# Patient Record
Sex: MALE | Race: BLACK OR AFRICAN AMERICAN | Employment: FULL TIME | URBAN - METROPOLITAN AREA
[De-identification: names, ages, dates, MRNs, and addresses within clinical notes are randomized per-mention and may not be internally consistent; named-entity substitution may affect disease eponyms.]

---

## 2017-07-07 PROBLEM — E83.111 HEMOCHROMATOSIS DUE TO REPEATED RED BLOOD CELL TRANSFUSIONS: Status: ACTIVE | Noted: 2017-07-07

## 2017-07-07 PROBLEM — M25.562 PAIN IN BOTH KNEES: Status: ACTIVE | Noted: 2017-07-07

## 2017-07-07 PROBLEM — G43.909 MIGRAINE: Status: ACTIVE | Noted: 2017-07-07

## 2017-07-07 PROBLEM — M54.9 BACK PAIN: Status: ACTIVE | Noted: 2017-07-07

## 2017-07-07 PROBLEM — M25.561 PAIN IN BOTH KNEES: Status: ACTIVE | Noted: 2017-07-07

## 2020-06-03 ENCOUNTER — HOSPITAL ENCOUNTER (EMERGENCY)
Age: 55
Discharge: HOME | End: 2020-06-03
Attending: EMERGENCY MEDICINE
Payer: COMMERCIAL

## 2020-06-03 VITALS
OXYGEN SATURATION: 97 % | SYSTOLIC BLOOD PRESSURE: 129 MMHG | BODY MASS INDEX: 34.55 KG/M2 | RESPIRATION RATE: 16 BRPM | WEIGHT: 215 LBS | HEART RATE: 94 BPM | HEIGHT: 66 IN | DIASTOLIC BLOOD PRESSURE: 87 MMHG | TEMPERATURE: 98.3 F

## 2020-06-03 DIAGNOSIS — T78.40XA ALLERGIC REACTION, INITIAL ENCOUNTER: Primary | ICD-10-CM

## 2020-06-03 PROCEDURE — 74011250636 HC RX REV CODE- 250/636: Performed by: EMERGENCY MEDICINE

## 2020-06-03 PROCEDURE — 99283 EMERGENCY DEPT VISIT LOW MDM: CPT

## 2020-06-03 PROCEDURE — 96372 THER/PROPH/DIAG INJ SC/IM: CPT

## 2020-06-03 RX ORDER — DEXAMETHASONE SODIUM PHOSPHATE 100 MG/10ML
10 INJECTION INTRAMUSCULAR; INTRAVENOUS
Status: COMPLETED | OUTPATIENT
Start: 2020-06-03 | End: 2020-06-03

## 2020-06-03 RX ORDER — EPINEPHRINE 0.3 MG/.3ML
0.3 INJECTION SUBCUTANEOUS
Qty: 0.3 ML | Refills: 0 | Status: SHIPPED | OUTPATIENT
Start: 2020-06-03 | End: 2020-06-03

## 2020-06-03 RX ADMIN — DEXAMETHASONE SODIUM PHOSPHATE 10 MG: 10 INJECTION INTRAMUSCULAR; INTRAVENOUS at 22:17

## 2020-06-03 NOTE — LETTER
[address] [facility] EMERGENCY DEPT 
[address] [facility] 
[phone] Work/School Note Date: 6/3/2020 To Whom It May concern: 
 
Clarice Ortiz was seen and treated today in the emergency room by the following provider(s): 
Attending Provider: Charito Wang MD. Clarice Ortiz may return to work on 6/5/20.  
 
Sincerely, 
 
 
 
 
Oni Noyola RN

## 2020-06-04 NOTE — ED PROVIDER NOTES
Patient was stung by an swatted a mosquito on his hand yesterday evening. This morning began with some itching and swelling throughout the day. No warmth redness or pain unless he tries to close his fist secondary to the swelling. No fever. Patient denies any wheezing trouble breathing or swelling of his lips face tongue or throat. No prior allergies to mosquitoes. He does have a pork allergy. The history is provided by the patient. Hand Swelling    This is a new problem. The current episode started 12 to 24 hours ago. The problem occurs constantly. The problem has been gradually worsening. Pain location: Left hand in the web between thumb and index finger. The pain is mild. Associated symptoms include itching. Pertinent negatives include no numbness and no tingling. He has tried nothing for the symptoms. There has been no history of extremity trauma. 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       Family History:   Problem Relation Age of Onset    Diabetes Mother     Hypertension Mother     Alzheimer Mother     Hypertension Father     Stroke Father     Diabetes Brother     Diabetes Sister     Hemachromatosis Other     Migraines Other        Social History     Socioeconomic History    Marital status:      Spouse name: Not on file    Number of children: Not on file    Years of education: Not on file    Highest education level: Not on file   Occupational History    Not on file   Social Needs    Financial resource strain: Not on file    Food insecurity     Worry: Not on file     Inability: Not on file   [employer] needs     Medical: Not on file     Non-medical: Not on file   Tobacco Use    Smoking status: Current Every Day Smoker     Packs/day: 1.00    Smokeless tobacco: Never Used   Substance and Sexual Activity    Alcohol use: No    Drug use: Yes     Types: Marijuana     Comment: 04/06/2011    Sexual activity: Not on file   Lifestyle    Physical activity     Days per week: Not on file     Minutes per session: Not on file    Stress: Not on file   Relationships    Social connections     Talks on phone: Not on file     Gets together: Not on file     Attends Quaker service: Not on file     Active member of club or organization: Not on file     Attends meetings of clubs or organizations: Not on file     Relationship status: Not on file    Intimate partner violence     Fear of current or ex partner: Not on file     Emotionally abused: Not on file     Physically abused: Not on file     Forced sexual activity: Not on file   Other Topics Concern    Not on file   Social History Narrative    Not on file         ALLERGIES: Pork/porcine containing products    Review of Systems   Constitutional: Negative for fever. Respiratory: Negative for shortness of breath and wheezing. Gastrointestinal: Negative for diarrhea, nausea and vomiting. Skin: Positive for itching. Negative for color change and rash. Neurological: Negative for tingling, weakness and numbness. Vitals:    06/03/20 2147   BP: (!) 144/94   Pulse: 94   Resp: 18   Temp: 98.2 °F (36.8 °C)   SpO2: 96%   Weight: 97.5 kg (215 lb)   Height: 5' 6\" (1.676 m)            Physical Exam  Vitals signs and nursing note reviewed. Cardiovascular:      Rate and Rhythm: Normal rate and regular rhythm. Heart sounds: Normal heart sounds. Pulmonary:      Effort: Pulmonary effort is normal.      Breath sounds: Normal breath sounds. Musculoskeletal:        Hands:    Neurological:      Mental Status: He is alert. MDM  Number of Diagnoses or Management Options  Diagnosis management comments: Probable localized reaction to mosquito bite or what ever stung him. No prior history of this. Decadron ordered. Antihistamines Pepcid will be recommended. No oral steroids as the swelling is pretty mild. EpiPen will be prescribed as a precaution. There is no sign of cellulitis.          Procedures

## 2020-06-04 NOTE — DISCHARGE INSTRUCTIONS
Patient Education      Tylenol for pain if needed. Elevate hand above heart to help with swelling. Ice for 15 minutes every 6 hours while awake for 2 to 3 days. Benadryl 25 to 50 mg every 6-8 hours as needed for itching. Pepcid 20 mg twice daily for 1 week. Use epinephrine injector in your lateral thigh if you develop skin rash with swelling of the lips face tongue or throat and return immediately to the emergency department. Follow-up with your primary care doctor in 7 to 10 days if symptoms do not improve. Allergic Reaction: Care Instructions  Your Care Instructions     An allergic reaction is an excessive response from your immune system to a medicine, chemical, food, insect bite, or other substance. A reaction can range from mild to life-threatening. Some people have a mild rash, hives, and itching or stomach cramps. In severe reactions, swelling of your tongue and throat can close up your airway so that you cannot breathe. Follow-up care is a key part of your treatment and safety. Be sure to make and go to all appointments, and call your doctor if you are having problems. It's also a good idea to know your test results and keep a list of the medicines you take. How can you care for yourself at home? · If you know what caused your allergic reaction, be sure to avoid it. Your allergy may become more severe each time you have a reaction. · Take an over-the-counter antihistamine, such as cetirizine (Zyrtec) or loratadine (Claritin), to treat mild symptoms. Read and follow directions on the label. Some antihistamines can make you feel sleepy. Do not give antihistamines to a child unless you have checked with your doctor first. Mild symptoms include sneezing or an itchy or runny nose; an itchy mouth; a few hives or mild itching; and mild nausea or stomach discomfort. · Do not scratch hives or a rash. Put a cold, moist towel on them or take cool baths to relieve itching.  Put ice packs on hives, swelling, or insect stings for 10 to 15 minutes at a time. Put a thin cloth between the ice pack and your skin. Do not take hot baths or showers. They will make the itching worse. · Your doctor may prescribe a shot of epinephrine to carry with you in case you have a severe reaction. Learn how to give yourself the shot and keep it with you at all times. Make sure it is not . · Go to the emergency room every time you have a severe reaction, even if you have used your shot of epinephrine and are feeling better. Symptoms can come back after a shot. · Wear medical alert jewelry that lists your allergies. You can buy this at most [employer]. · If your child has a severe allergy, make sure that his or her teachers, babysitters, coaches, and other caregivers know about the allergy. They should have an epinephrine shot, know how and when to give it, and have a plan to take your child to the hospital.  When should you call for help? Give an epinephrine shot if:  · You think you are having a severe allergic reaction. · You have symptoms in more than one body area, such as mild nausea and an itchy mouth. After giving an epinephrine shot call 911, even if you feel better. [id number] if:  · You have symptoms of a severe allergic reaction. These may include:  ? Sudden raised, red areas (hives) all over your body. ? Swelling of the throat, mouth, lips, or tongue. ? Trouble breathing. ? Passing out (losing consciousness). Or you may feel very lightheaded or suddenly feel weak, confused, or restless. · You have been given an epinephrine shot, even if you feel better. Call your doctor now or seek immediate medical care if:  · You have symptoms of an allergic reaction, such as:  ? A rash or hives (raised, red areas on the skin). ? Itching. ? Swelling. ? Belly pain, nausea, or vomiting. Watch closely for changes in your health, and be sure to contact your doctor if:  · You do not get better as expected.   Where can you learn more?  Go to http://jessie-whit.info/  Enter C815 in the search box to learn more about \"Allergic Reaction: Care Instructions. \"  Current as of: October 7, 2019               Content Version: 12.5  © 7772-6196 Healthwise, Incorporated. Care instructions adapted under license by [employer] (which disclaims liability or warranty for this information). If you have questions about a medical condition or this instruction, always ask your healthcare professional. [address] any warranty or liability for your use of this information.

## 2020-06-04 NOTE — ED NOTES
I have reviewed discharge instructions with the patient. The patient verbalized understanding. Patient left ED via Discharge Method: ambulatory to Home with self. Opportunity for questions and clarification provided. Patient given 1 scripts. To continue your aftercare when you leave the hospital, you may receive an automated call from our care team to check in on how you are doing. This is a free service and part of our promise to provide the best care and service to meet your aftercare needs.  If you have questions, or wish to unsubscribe from this service please call [phone]. Thank you for Choosing our [facility] Emergency Department.

## 2020-08-26 ENCOUNTER — APPOINTMENT (OUTPATIENT)
Dept: GENERAL RADIOLOGY | Age: 55
End: 2020-08-26
Attending: EMERGENCY MEDICINE
Payer: COMMERCIAL

## 2020-08-26 ENCOUNTER — HOSPITAL ENCOUNTER (EMERGENCY)
Age: 55
Discharge: HOME | End: 2020-08-26
Attending: EMERGENCY MEDICINE
Payer: COMMERCIAL

## 2020-08-26 VITALS
DIASTOLIC BLOOD PRESSURE: 90 MMHG | HEIGHT: 66 IN | WEIGHT: 220 LBS | HEART RATE: 88 BPM | BODY MASS INDEX: 35.36 KG/M2 | RESPIRATION RATE: 18 BRPM | SYSTOLIC BLOOD PRESSURE: 138 MMHG | TEMPERATURE: 98.6 F | OXYGEN SATURATION: 97 %

## 2020-08-26 DIAGNOSIS — M17.11 OSTEOARTHRITIS OF RIGHT KNEE, UNSPECIFIED OSTEOARTHRITIS TYPE: Primary | ICD-10-CM

## 2020-08-26 PROCEDURE — 73562 X-RAY EXAM OF KNEE 3: CPT

## 2020-08-26 PROCEDURE — 99283 EMERGENCY DEPT VISIT LOW MDM: CPT

## 2020-08-26 RX ORDER — TRAMADOL HYDROCHLORIDE 50 MG/1
50 TABLET ORAL
Qty: 11 TAB | Refills: 0 | Status: SHIPPED | OUTPATIENT
Start: 2020-08-26 | End: 2020-08-29

## 2020-08-26 RX ORDER — PREDNISONE 20 MG/1
40 TABLET ORAL DAILY
Qty: 10 TAB | Refills: 0 | Status: SHIPPED | OUTPATIENT
Start: 2020-08-26 | End: 2020-08-31

## 2020-08-26 NOTE — ED NOTES
I have reviewed discharge instructions with the patient. The patient verbalized understanding. Patient left ED via Discharge Method: ambulatory to Home. Opportunity for questions and clarification provided. Patient given 2 scripts. To continue your aftercare when you leave the hospital, you may receive an automated call from our care team to check in on how you are doing. This is a free service and part of our promise to provide the best care and service to meet your aftercare needs.  If you have questions, or wish to unsubscribe from this service please call [phone]. Thank you for Choosing our [facility] Emergency Department.

## 2020-08-26 NOTE — LETTER
NOTIFICATION RETURN TO WORK / SCHOOL 
 
8/26/2020 1:51 AM 
 
Mr. Jami Estrada [address] [facility] [id number] To Whom It May Concern: 
 
Jami Estrada is currently under the care of [facility] EMERGENCY DEPT. He will return to work/school on:  8/28/20 If there are questions or concerns please have the patient contact our office. Sincerely, Narinder Frazier MD

## 2020-08-26 NOTE — ED TRIAGE NOTES
Arrives with face mask in place. Arrives ambulatory with difficulty to triage. Reports right knee pain, onset approx 1 week pta. Denies injury/trauma. States \"popped\" when getting out of shower yesterday AM. Increased pain since. Reports works as , climbs up onto machine. Reports excessive bending and squatting. Hx chronic knee problems.  Arrives with knee brace on

## 2020-08-26 NOTE — DISCHARGE INSTRUCTIONS
Take medications as prescribed  If symptoms persist, follow-up with orthopedics  Return to the ER for any new, worsening or life-threatening symptoms      Knee Arthritis: Care Instructions  Your Care Instructions     Knee arthritis is a breakdown of the cartilage that cushions your knee joint. When the cartilage wears down, your bones rub against each other. This causes pain and stiffness. Knee arthritis tends to get worse with time. Treatment for knee arthritis involves reducing pain, making the leg muscles stronger, and staying at a healthy body weight. The treatment usually does not improve the health of the cartilage, but it can reduce pain and improve how well your knee works. You can take simple measures to protect your knee joints, ease your pain, and help you stay active. Follow-up care is a key part of your treatment and safety. Be sure to make and go to all appointments, and call your doctor if you are having problems. It's also a good idea to know your test results and keep a list of the medicines you take. How can you care for yourself at home? · Know that knee arthritis will cause more pain on some days than on others. · Stay at a healthy weight. Lose weight if you are overweight. When you stand up, the pressure on your knees from every pound of body weight is multiplied four times. So if you lose 10 pounds, you will reduce the pressure on your knees by 40 pounds. · Talk to your doctor or physical therapist about exercises that will help ease joint pain. ? Stretch to help prevent stiffness and to prevent injury before you exercise. You may enjoy gentle forms of yoga to help keep your knee joints and muscles flexible. ? Walk instead of jog.  ? Ride a bike. This makes your thigh muscles stronger and takes pressure off your knee. ? Wear well-fitting and comfortable shoes. ? Exercise in chest-deep water. This can help you exercise longer with less pain. ?  Avoid exercises that include squatting or kneeling. They can put a lot of strain on your knees. ? Talk to your doctor to make sure that the exercise you do is not making the arthritis worse. · Do not sit for long periods of time. Try to walk once in a while to keep your knee from getting stiff. · Ask your doctor or physical therapist whether shoe inserts may reduce your arthritis pain. · If you can afford it, get new athletic shoes at least every year. This can help reduce the strain on your knees. · Use a device to help you do everyday activities. ? A cane or walking stick can help you keep your balance when you walk. Hold the cane or walking stick in the hand opposite the painful knee. ? If you feel like you may fall when you walk, try using crutches or a front-wheeled walker. These can prevent falls that could cause more damage to your knee. ? A knee brace may help keep your knee stable and prevent pain. ? You also can use other things to make life easier, such as a higher toilet seat and handrails in the bathtub or shower. · Take pain medicines exactly as directed. ? Do not wait until you are in severe pain. You will get better results if you take it sooner. ? If you are not taking a prescription pain medicine, take an over-the-counter medicine such as acetaminophen (Tylenol), ibuprofen (Advil, Motrin), or naproxen (Aleve). Read and follow all instructions on the label. ? Do not take two or more pain medicines at the same time unless the doctor told you to. Many pain medicines have acetaminophen, which is Tylenol. Too much acetaminophen (Tylenol) can be harmful. ? Tell your doctor if you take a blood thinner, have diabetes, or have allergies to shellfish. · Ask your doctor if you might benefit from a shot of steroid medicine into your knee. This may provide pain relief for several months. · Many people take the supplements glucosamine and chondroitin for osteoarthritis.  Some people feel they help, but the medical research does not show that they work. Talk to your doctor before you take these supplements. When should you call for help? Call your doctor now or seek immediate medical care if:  · You have sudden swelling, warmth, or pain in your knee. · You have knee pain and a fever or rash. · You have such bad pain that you cannot use your knee. Watch closely for changes in your health, and be sure to contact your doctor if you have any problems. Where can you learn more? Go to http://www.gray.com/  Enter W187 in the search box to learn more about \"Knee Arthritis: Care Instructions. \"  Current as of: December 9, 2019               Content Version: 12.5  © 8223-0829 Healthways. Care instructions adapted under license by [employer] (which disclaims liability or warranty for this information). If you have questions about a medical condition or this instruction, always ask your healthcare professional. [place] [address] any warranty or liability for your use of this information. Patient Education        Knee Arthritis: Exercises  Introduction  Here are some examples of exercises for you to try. The exercises may be suggested for a condition or for rehabilitation. Start each exercise slowly. Ease off the exercises if you start to have pain. You will be told when to start these exercises and which ones will work best for you. How to do the exercises  Knee flexion with heel slide   1. Lie on your back with your knees bent. 2. Slide your heel back by bending your affected knee as far as you can. Then hook your other foot around your ankle to help pull your heel even farther back. 3. Hold for about 6 seconds, then rest for up to 10 seconds. 4. Repeat 8 to 12 times. 5. Switch legs and repeat steps 1 through 4, even if only one knee is sore. Quad sets   1. Sit with your affected leg straight and supported on the floor or a firm bed.  Place a small, rolled-up towel under your knee. Your other leg should be bent, with that foot flat on the floor. 2. Tighten the thigh muscles of your affected leg by pressing the back of your knee down into the towel. 3. Hold for about 6 seconds, then rest for up to 10 seconds. 4. Repeat 8 to 12 times. 5. Switch legs and repeat steps 1 through 4, even if only one knee is sore. Straight-leg raises to the front   1. Lie on your back with your good knee bent so that your foot rests flat on the floor. Your affected leg should be straight. Make sure that your low back has a normal curve. You should be able to slip your hand in between the floor and the small of your back, with your palm touching the floor and your back touching the back of your hand. 2. Tighten the thigh muscles in your affected leg by pressing the back of your knee flat down to the floor. Hold your knee straight. 3. Keeping the thigh muscles tight and your leg straight, lift your affected leg up so that your heel is about 12 inches off the floor. Hold for about 6 seconds, then lower slowly. 4. Relax for up to 10 seconds between repetitions. 5. Repeat 8 to 12 times. 6. Switch legs and repeat steps 1 through 5, even if only one knee is sore. Active knee flexion   1. Lie on your stomach with your knees straight. If your kneecap is uncomfortable, roll up a washcloth and put it under your leg just above your kneecap. 2. Lift the foot of your affected leg by bending the knee so that you bring the foot up toward your buttock. If this motion hurts, try it without bending your knee quite as far. This may help you avoid any painful motion. 3. Slowly move your leg up and down. 4. Repeat 8 to 12 times. 5. Switch legs and repeat steps 1 through 4, even if only one knee is sore. Quadriceps stretch (facedown)   1. Lie flat on your stomach, and rest your face on the floor. 2. Wrap a towel or belt strap around the lower part of your affected leg.  Then use the towel or belt strap to slowly pull your heel toward your buttock until you feel a stretch. 3. Hold for about 15 to 30 seconds, then relax your leg against the towel or belt strap. 4. Repeat 2 to 4 times. 5. Switch legs and repeat steps 1 through 4, even if only one knee is sore. Stationary exercise bike   1. If you do not have a stationary exercise bike at home, you can find one to ride at your local health club or community center. 2. Adjust the height of the bike seat so that your knee is slightly bent when your leg is extended downward. If your knee hurts when the pedal reaches the top, you can raise the seat so that your knee does not bend as much. 3. Start slowly. At first, try to do 5 to 10 minutes of cycling with little to no resistance. Then increase your time and the resistance bit by bit until you can do 20 to 30 minutes without pain. 4. If you start to have pain, rest your knee until your pain gets back to the level that is normal for you. Or cycle for less time or with less effort. Follow-up care is a key part of your treatment and safety. Be sure to make and go to all appointments, and call your doctor if you are having problems. It's also a good idea to know your test results and keep a list of the medicines you take. Where can you learn more? Go to http://jessie-whit.info/  Enter C159 in the search box to learn more about \"Knee Arthritis: Exercises. \"  Current as of: March 2, 2020               Content Version: 12.5  © 2006-2020 Healthwise, Incorporated. Care instructions adapted under license by [employer] (which disclaims liability or warranty for this information). If you have questions about a medical condition or this instruction, always ask your healthcare professional. [address] any warranty or liability for your use of this information.

## 2020-08-26 NOTE — ED PROVIDER NOTES
Patient presents to the ER complaining of right knee pain. Reports symptoms have been present for the past couple days. Denies any injury. Reports pain is worse with movement and certain positions. Denies any fevers or chills. Denies any leg pain or swelling    The history is provided by the patient. Knee Pain    This is a new problem. The current episode started more than 2 days ago. The problem has been gradually worsening. The pain is present in the right knee. The quality of the pain is described as aching. The pain is at a severity of 3/10. The pain is moderate. Associated symptoms include stiffness. Pertinent negatives include no back pain. He has tried nothing for the symptoms. 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       Family History:   Problem Relation Age of Onset    Diabetes Mother     Hypertension Mother     Alzheimer Mother     Hypertension Father     Stroke Father     Diabetes Brother     Diabetes Sister     Hemachromatosis Other     Migraines Other        Social History     Socioeconomic History    Marital status:      Spouse name: Not on file    Number of children: Not on file    Years of education: Not on file    Highest education level: Not on file   Occupational History    Not on file   Social Needs    Financial resource strain: Not on file    Food insecurity     Worry: Not on file     Inability: Not on file   [employer] needs     Medical: Not on file     Non-medical: Not on file   Tobacco Use    Smoking status: Current Every Day Smoker     Packs/day: 1.00    Smokeless tobacco: Never Used   Substance and Sexual Activity    Alcohol use: No    Drug use: Yes     Types: Marijuana     Comment: 04/06/2011    Sexual activity: Not on file   Lifestyle    Physical activity     Days per week: Not on file     Minutes per session: Not on file    Stress: Not on file   Relationships    Social connections     Talks on phone: Not on file     Gets together: Not on file     Attends Quaker service: Not on file     Active member of club or organization: Not on file     Attends meetings of clubs or organizations: Not on file     Relationship status: Not on file    Intimate partner violence     Fear of current or ex partner: Not on file     Emotionally abused: Not on file     Physically abused: Not on file     Forced sexual activity: Not on file   Other Topics Concern    Not on file   Social History Narrative    Not on file         ALLERGIES: Pork/porcine containing products    Review of Systems   Constitutional: Negative for fatigue, fever and unexpected weight change. HENT: Negative for congestion and dental problem. Eyes: Negative for redness and visual disturbance. Respiratory: Negative for chest tightness, shortness of breath and wheezing. Cardiovascular: Negative for chest pain and leg swelling. Genitourinary: Negative for urgency. Musculoskeletal: Positive for stiffness. Negative for back pain. Skin: Negative for color change and pallor. Neurological: Negative for weakness. Hematological: Negative for adenopathy. Does not bruise/bleed easily. Psychiatric/Behavioral: Negative for behavioral problems and confusion. All other systems reviewed and are negative. Vitals:    08/26/20 0019   BP: 130/88   Pulse: 87   Resp: 18   Temp: 98.6 °F (37 °C)   SpO2: 97%   Weight: 99.8 kg (220 lb)   Height: 5' 6\" (1.676 m)            Physical Exam  Vitals signs and nursing note reviewed. Constitutional:       Appearance: Normal appearance. HENT:      Head: Normocephalic and atraumatic. Neck:      Musculoskeletal: Normal range of motion and neck supple. Cardiovascular:      Rate and Rhythm: Normal rate and regular rhythm.    Pulmonary:      Effort: Pulmonary effort is normal. Breath sounds: Normal breath sounds. Abdominal:      General: Abdomen is flat. Bowel sounds are normal.      Palpations: Abdomen is soft. Musculoskeletal: Normal range of motion. Right knee: He exhibits swelling. Skin:     General: Skin is warm. Neurological:      General: No focal deficit present. Mental Status: He is alert. MDM  Number of Diagnoses or Management Options  Diagnosis management comments: Plan for x-ray of right knee here. No signs of any infection or edema    1:49 AM  X-ray shows no acute abnormalities. Mild osteoarthritis noted. Old calcified MCL injury noted    Discussed this with the patient. Short course of steroids as well as pain medicine. Amount and/or Complexity of Data Reviewed  Tests in the radiology section of CPT®: ordered and reviewed    Risk of Complications, Morbidity, and/or Mortality  Presenting problems: moderate  Diagnostic procedures: low  Management options: low    Patient Progress  Patient progress: stable         Procedures          Results Include:    No results found for this or any previous visit (from the past 24 hour(s)). Voice dictation software was used during the making of this note. This software is not perfect and grammatical and other typographical errors may be present. This note has been proofread, but may still contain errors.   Pineda Little MD; 8/26/2020 @1:49 AM   ===================================================================

## 2021-03-08 ENCOUNTER — HOSPITAL ENCOUNTER (OUTPATIENT)
Dept: PHYSICAL THERAPY | Age: 56
Discharge: HOME | End: 2021-03-08
Payer: COMMERCIAL

## 2021-03-08 PROCEDURE — 97110 THERAPEUTIC EXERCISES: CPT

## 2021-03-08 PROCEDURE — 97140 MANUAL THERAPY 1/> REGIONS: CPT

## 2021-03-08 PROCEDURE — 97161 PT EVAL LOW COMPLEX 20 MIN: CPT

## 2021-03-08 NOTE — THERAPY EVALUATION
Berta Baker : 1965 Primary: [facility]* Secondary:  Therapy Center at [address] [place] [address], [address] [place] [address] [address], [address] [phone]   [phone] OUTPATIENT PHYSICAL THERAPY:Initial Assessment 3/8/2021 ICD-10: Treatment Diagnosis: M17.11, M25.561 Precautions/Allergies:  
Pork/porcine containing products TREATMENT PLAN: 
Effective Dates: 3/8/2021 TO 2021 (90 days). Frequency/Duration: 2 times a week for 90 Day(s) MEDICAL/REFERRING DIAGNOSIS: 
Unilateral primary osteoarthritis, left knee [M17.12] DATE OF ONSET: 6mo ago REFERRING PHYSICIAN: Roxane Link MD MD Orders: Kaitlyn Harding and treat Return MD Appointment:   
 
INITIAL ASSESSMENT:  Mr. Isra Horton presents to physical therapy with MD diagnosis of a R knee pain. Pt demonstrated signs and symptoms consistent with this diagnosis. On objective examination, the patient demonstrated deficits in ROM, strength, joint mobility, soft tissue mobility. The patient also had increased pain. The patient is limited in the following activities: walking, standing, lifting, work, house work, ADLs, sleeping. The patient has a good  prognosis for recovery based on the examination findings and may be limited by: N/A. Patient requires skilled physical therapy services in order to return to prior level of function. PROBLEM LIST (Impacting functional limitations): 1. Decreased Strength 2. Decreased ADL/Functional Activities 3. Decreased Ambulation Ability/Technique 4. Increased Pain 5. Decreased Activity Tolerance 6. Decreased Flexibility/Joint Mobility 7. Decreased Knowledge of Precautions 8. Decreased [place] with Home Exercise Program INTERVENTIONS PLANNED: (Treatment may consist of any combination of the following) 1. Balance Exercise 2. Bed Mobility 3. Gait Training 4. Heat 5. Home Exercise Program (HEP) 6. Manual Therapy 7. Neuromuscular Re-education/Strengthening 8. Range of Motion (ROM) 9. Therapeutic Activites 10. Therapeutic Exercise/Strengthening GOALS: (Goals have been discussed and agreed upon with patient.) Short-Term Functional Goals: Time Frame: 4 weeks 1. Pt will be compliant with progressive HEP 2. Pt will have 125deg knee flexion Discharge Goals: Time Frame: 10 weeks 1. Pt will have 5/5 knee extension strength 2. Pt will improve KOOS Jr by 10pts 3. Pt will have 0deg knee extension OUTCOME MEASURE:  
Tool Used: KOOS Jr 
Score:  Initial: 21/28 Most Recent: X/28 (Date: -- ) Interpretation of Score: 20 questions each scored on a 5 point scale with 0 representing \"extreme difficulty or unable to perform\" and 4 representing \"no difficulty\". The lower the score, the greater the functional disability. 80/80 represents no disability. Minimal detectable change is 9 points. MEDICAL NECESSITY:  
· Patient is expected to demonstrate progress in strength, range of motion, balance, coordination and functional technique to increase independence with ADLs and functional tasks. REASON FOR SERVICES/OTHER COMMENTS: 
· Patient requires skilled physical therapy in order to return to prior level of function. Total Duration: 
  
 
Rehabilitation Potential For Stated Goals: Good Regarding Amber Martinmons's therapy, I certify that the treatment plan above will be carried out by a therapist or under their direction. Thank you for this referral, 
Florin Rivera PT, DPT, OCS Referring Physician Signature: Jay Berumen MD _______________________________ Date _____________ PAIN/SUBJECTIVE:  
Initial:   2 Post Session:  1/10 HISTORY:  
History of Injury/Illness (Reason for Referral): 
 Pt states he has right knee pain. Pt states it has been bothering him for several months, but has gotten worse over the last few weeks. Pain is located on the outside of the R knee and at the kneecap. Pain is pretty constant, but will get worse if he sits in a low chair, turns on that leg. Stiff in the morning but feels better after a few minutes of moving. Aggs: standing, cold weather, sleeping on that side. Pain @ worst: 6/10, Pain @ best: 1/10. Past Medical History/Comorbidities:  
Mr. 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 He also has no past medical history of Arthritis, Asthma, Autoimmune disease ([address] CAD (coronary artery disease), Cancer ([address] Chronic kidney disease, COPD, Dementia, Diabetes ([address] Endocrine disease, Heart failure ([address] Hypertension, Infectious disease, Liver disease, Other ill-defined conditions, Psychiatric disorder, PUD (peptic ulcer disease), Sleep disorder, Stroke ([address] or Thromboembolus ([address] Mr. Ashley Mcginnis  has no past surgical history on file. Social History/Living Environment:  
  Lives alone Prior Level of Function/Work/Activity: 
Work:  (on and off lift) Activities: walking, house work Personal Factors:   
      Sex:  male Age:  63 y.o. Ambulatory/Rehab Services H2 Model Falls Risk Assessment Risk Factors: 
     (1)  Gender [Male] Ability to Rise from Chair: 
     (0)  Ability to rise in a single movement Falls Prevention Plan: No modifications necessary Total: (5 or greater = High Risk): 1 ©2010 [facility] [address] [place] [address] States Patent #[id number]. Federal Law prohibits the replication, distribution or use without written permission from [facility] [employer] Current Medications:   
  
Current Outpatient Medications:   meloxicam (MOBIC) 15 mg tablet, Take 1 Tab by mouth daily. , Disp: 30 Tab, Rfl: 2 Date Last Reviewed:  03/08/21 Number of Personal Factors/Comorbidities that affect the Plan of Care: 1-2: MODERATE COMPLEXITY EXAMINATION:  
*= Painful     WNL= within normal limits     NT= not tested Observation Gait: slight limp on R leg ROM Right Left Flexion 108 139 Extension -4 0 Strength (all MMT scores are graded on a scale of 0-5) Right Left Hip Flexion 5 5 Abduction 4- 4 Extension 4- 4-  
Glute Max 4- 4-  
Knee Extension 4* 5 Flexion 5 5 Joint/Soft Tissue Mobility Description Joint Mobility ? Knee: hypomobile and painful Soft Tissue Mobility TTP around knee, joint line tenderness Functional Mobility 30sec Sit to Stand: 6x 
DL Squat: painful, difficult to do, shifts weight to L, uses hands to get up Picking up object from floor: uses L leg only, kicks R leg back Body Structures Involved: 1. Bones 2. Joints 3. Muscles 4. Ligaments Body Functions Affected: 1. Sensory/Pain 2. Neuromusculoskeletal 
3. Movement Related Activities and Participation Affected: 1. General Tasks and Demands 2. Mobility 3. Self Care 4. Domestic Life 5. Interpersonal Interactions and Relationships 6. Community, Social and [place] Number of elements (examined above) that affect the Plan of Care: 3: MODERATE COMPLEXITY CLINICAL PRESENTATION:  
Presentation: Stable and uncomplicated: LOW COMPLEXITY CLINICAL DECISION MAKING:  
Use of outcome tool(s) and clinical judgement create a POC that gives a: Clear prediction of patient's progress: LOW COMPLEXITY Tari Reveal PT, DPT, OCS

## 2021-03-08 NOTE — PROGRESS NOTES
Marquise Bras  : 1965  Primary: [facility]*  Secondary:  Therapy Center at [facility]  [address], [facility] [address]  [phone]   [phone]         OUTPATIENT PHYSICAL THERAPY:Daily Note 3/8/2021      TREATMENT:   PT Patient Time In/Time Out  Time In: 08  Time Out: 4536      Total Time: 45min  Visit Count:  1     ICD-10: Treatment Diagnosis: M17.11, M25.561  Medication Last Reviewed: 21      TREATMENT PLAN  Effective Dates: 3/8/2021 TO 2021 (90 days). Frequency/Duration: 2 times a week for 90 Day(s)         Subjective: See Evaluation Note dated 3/8/2021 for details   Pain:     Objective: See Evaluation Note dated 3/8/2021 for details      Therapeutic Exercise: (20min) Done in order to improve strength, ROM and understanding of current condition. Date:  3/8 Date:   Date:   Date:     Activity/Exercise Parameters      Education Discussed examination findings, HEP, plan of care                                           Access Code: [email or web address]  URL: [email or web address]  Date: 2021  Prepared by:  Tari Armando    Exercises  Supine Bridge  Supine Active Straight Leg Raise  Standing Hip Abduction    Manual Therapy: (12min) Done in order to improve joint and soft tissue mobility,reduce muscle guarding, and decrease muscle tone   Date:  3/8 Date:   Date:   Date:     Type Parameters      Joint Mobilization Knee: A-P, P-A, traction grades I-III      Soft Tissue Mobilization           Modalities: (-) Done in order to reduce swelling and pain    Assessment: See Evaluation Note dated 3/8/2021 for details    Plan: See Evaluation Note dated 3/8/2021 for details    Future Appointments   Date Time Provider Lisa Martínez   3/11/2021  8:00 AM Pily Hyndman, PT [facility]   3/15/2021  8:00 AM Pily Hyndman, PT [facility]   3/18/2021  8:00 AM Pily Hyndman, PT [facility]   3/22/2021 8:00 AM Garsia Daughters, PT [facility]   3/24/2021  8:00 AM Garsia Daughters, PT [facility]   3/29/2021  8:00 AM Garsia Daughters, PT [facility]   4/1/2021  8:00 AM Garsia Daughters, PT [facility]   4/5/2021  8:00 AM Garsia Daughters, PT [facility]   4/8/2021  8:00 AM Garsia Daughters, PT [facility]   4/12/2021  8:00 AM Garsia Daughters, PT [facility]   4/15/2021  8:00 AM Garsia Daughters, PT [facility]       Patricia Davalos Time: 12min eval  Units: 1 eval low/ 1MT/ 1TE      Alexus Boyce, PT, DPT, OCS    Visit Approval Visit # Therapist initials Date A NS / Cx < 24 hr >24 hr Cx Comments    1 WJ 3/8 [x]  [] [] Initial evaluation       [] [] []        [] [] []        [] [] []        [] [] []        [] [] []        [] [] []        [] [] []        [] [] []        [] [] []        [] [] []        [] [] []        [] [] []        [] [] []        [] [] []        [] [] []        [] [] []        [] [] []

## 2021-03-11 ENCOUNTER — HOSPITAL ENCOUNTER (OUTPATIENT)
Dept: PHYSICAL THERAPY | Age: 56
Discharge: HOME | End: 2021-03-11
Payer: COMMERCIAL

## 2021-03-11 PROCEDURE — 97140 MANUAL THERAPY 1/> REGIONS: CPT

## 2021-03-11 PROCEDURE — 97110 THERAPEUTIC EXERCISES: CPT

## 2021-03-11 NOTE — PROGRESS NOTES
Noemy Barneyard  : 1965  Primary: [facility]*  Secondary:  Therapy Center at [facility] Therapy  [address], [address]  [phone]   [phone]         OUTPATIENT PHYSICAL THERAPY:Daily Note 3/11/2021      TREATMENT:   PT Patient Time In/Time Out  Time In: 0800  Time Out: 08      Total Time: 45min  Visit Count:  2     ICD-10: Treatment Diagnosis: M17.11, M25.561  Medication Last Reviewed: 21      TREATMENT PLAN  Effective Dates: 3/8/2021 TO 2021 (90 days). Frequency/Duration: 2 times a week for 90 Day(s)         Subjective: Pt states he was very sore Monday, but was feeling better after   Pain:     Objective: Therapeutic Exercise: (33min) Done in order to improve strength, ROM and understanding of current condition. Date:  3/8 Date:  3/11 Date:   Date:     Activity/Exercise Parameters      Education Discussed examination findings, HEP, plan of care      NuStep  x10min lvl 2-3     SLR 3x5 B 3x5 B     Bridges 3x10 3x10     Standing Hip ABD 2x10 B 2x10 25lbs     Hip Flexion  2x10 25lbs     Sidestepping  3x35ft green TB     Knee Extension Machine  3x10 35lbs     Heel Slides  x4min                                   Access Code: [email or web address]  URL: [email or web address]  Date: 2021  Prepared by:  Dee Stein  Supine Bridge  Supine Active Straight Leg Raise  Standing Hip Abduction    Manual Therapy: (12min) Done in order to improve joint and soft tissue mobility,reduce muscle guarding, and decrease muscle tone   Date:  3/8 Date:  3/11 Date:   Date:     Type Parameters      Joint Mobilization Knee: A-P, P-A, traction grades I-III Knee: A-P, P-A grades I-III     Soft Tissue Mobilization           Modalities: (-) Done in order to reduce swelling and pain    Assessment: Pt able to tolerate all exercises well, walks better with less pain or stiffness    Plan: continue per POC    Future Appointments Date Time Provider Lisa Tanya   3/15/2021  8:00 AM Lesle Falter, PT [facility]   3/18/2021  8:00 AM Lesle Falter, PT [facility]   3/22/2021  8:00 AM Lesle Falter, PT [facility]   3/24/2021  8:00 AM Lesle Falter, PT [facility]   3/29/2021  8:00 AM Lesle Falter, PT [facility]   4/1/2021  8:00 AM Lesle Falter, PT [facility]   4/5/2021  8:00 AM Lesle Falter, PT [facility]   4/8/2021  8:00 AM Lesle Falter, PT [facility]   4/12/2021  8:00 AM Lesle Falter, PT [facility]   4/15/2021  8:00 AM Lesle Falter, PT [facility]       Lloyd Mejia Time:  Units: 2TE/ 1MT      Kim Plants, PT, DPT, OCS    Visit Approval Visit # Therapist initials Date A NS / Cx < 24 hr >24 hr Cx Comments    1 WJ 3/8 [x]  [] [] Initial evaluation    2 WJ 3/11 [x] [] []        [] [] []        [] [] []        [] [] []        [] [] []        [] [] []        [] [] []        [] [] []        [] [] []        [] [] []        [] [] []        [] [] []        [] [] []        [] [] []        [] [] []        [] [] []        [] [] []

## 2021-03-15 ENCOUNTER — HOSPITAL ENCOUNTER (OUTPATIENT)
Dept: PHYSICAL THERAPY | Age: 56
Discharge: HOME | End: 2021-03-15
Payer: COMMERCIAL

## 2021-03-15 PROCEDURE — 97110 THERAPEUTIC EXERCISES: CPT

## 2021-03-15 NOTE — PROGRESS NOTES
Brandie Darlingmpf  : 1965  Primary: [facility]*  Secondary:  [facility] at [facility]  [address], [address]  [phone]   [phone]         OUTPATIENT PHYSICAL THERAPY:Daily Note 3/15/2021      TREATMENT:   PT Patient Time In/Time Out  Time In: 0800  Time Out: 0845      Total Time: 45min  Visit Count:  3     ICD-10: Treatment Diagnosis: M17.11, M25.561  Medication Last Reviewed: 03/15/21      TREATMENT PLAN  Effective Dates: 3/8/2021 TO 2021 (90 days). Frequency/Duration: 2 times a week for 90 Day(s)         Subjective: Pt states his knee popped over the weekend as he stood and turned while WBing on it, it was painful but feels better now   Pain:     Objective: Therapeutic Exercise: (45min) Done in order to improve strength, ROM and understanding of current condition. Date:  3/8 Date:  3/11 Date:  3/15 Date:     Activity/Exercise Parameters      Education Discussed examination findings, HEP, plan of care      NuStep  x10min lvl 2-3 x10min lvl 4-5    SLR 3x5 B 3x5 B 4x10 B    Bridges 3x10 3x10 3x10    Standing Hip ABD 2x10 B 2x10 25lbs 3x10 25lbs    Hip Flexion  2x10 25lbs 3x10 25lbs    Sidestepping  3x35ft green TB     Knee Extension Machine  3x10 35lbs     Heel Slides  x4min     Clams    3x10-15 B green TB    Squats   3x10    Step Ups   3x5 8\"             Access Code: [email or web address]  URL: [email or web address]  Date: 2021  Prepared by:  Tyshawn Cardozo    Exercises  Supine Bridge  Supine Active Straight Leg Raise  Standing Hip Abduction    Manual Therapy: (0min) Done in order to improve joint and soft tissue mobility,reduce muscle guarding, and decrease muscle tone   Date:  3/8 Date:  3/11 Date:   Date:     Type Parameters      Joint Mobilization Knee: A-P, P-A, traction grades I-III Knee: A-P, P-A grades I-III     Soft Tissue Mobilization           Modalities: (-) Done in order to reduce swelling and pain    Assessment: Pt able to progress all exercises and do more functional/WBing exercises without pain    Plan: continue per POC    Future Appointments   Date Time Provider Lisa Martínez   3/18/2021  8:00 AM Whitley Elburn, PT [facility]   3/22/2021  8:00 AM Whitley Elburn, PT [facility]   3/24/2021  8:00 AM Whitley Elburn, PT [facility]   3/29/2021  8:00 AM Whitley Elburn, PT [facility]   4/1/2021  8:00 AM Whitley Elburn, PT [facility]   4/5/2021  8:00 AM Whitley Elburn, PT [facility]   4/8/2021  8:00 AM Whitley Elburn, PT [facility]   4/12/2021  8:00 AM Whitley Elburn, PT [facility]   4/15/2021  8:00 AM Whitley Elburn, PT [facility]       Andreina Ziyad Time:  Units: 3TE      Jamison Comero, PT, DPT, OCS    Visit Approval Visit # Therapist initials Date A NS / Cx < 24 hr >24 hr Cx Comments    1 WJ 3/8 [x]  [] [] Initial evaluation    2 WJ 3/11 [x] [] []     3 WJ 3/15 [x] [] []        [] [] []        [] [] []        [] [] []        [] [] []        [] [] []        [] [] []        [] [] []        [] [] []        [] [] []        [] [] []        [] [] []        [] [] []        [] [] []        [] [] []        [] [] []

## 2021-03-18 ENCOUNTER — HOSPITAL ENCOUNTER (OUTPATIENT)
Dept: PHYSICAL THERAPY | Age: 56
Discharge: HOME | End: 2021-03-18
Payer: COMMERCIAL

## 2021-03-18 PROCEDURE — 97110 THERAPEUTIC EXERCISES: CPT

## 2021-03-18 NOTE — PROGRESS NOTES
Mariah Canela  : 1965  Primary: Britany Soto Of Leonor Johnson*  Secondary:  [facility]  [address], [address]  [phone]   [phone]         OUTPATIENT PHYSICAL THERAPY:Daily Note 3/18/2021      TREATMENT:   PT Patient Time In/Time Out  Time In: 0800  Time Out: 0845      Total Time: 45min  Visit Count:  4     ICD-10: Treatment Diagnosis: M17.11, M25.561  Medication Last Reviewed: 21      TREATMENT PLAN  Effective Dates: 3/8/2021 TO 2021 (90 days). Frequency/Duration: 2 times a week for 90 Day(s)         Subjective: Pt states his knee feels better today   Pain:     Objective: Therapeutic Exercise: (45min) Done in order to improve strength, ROM and understanding of current condition. Date:  3/8 Date:  3/11 Date:  3/15 Date:     Activity/Exercise Parameters      Education Discussed examination findings, HEP, plan of care      NuStep  x10min lvl 2-3 x10min lvl 4-5 x10min lvl 4   SLR 3x5 B 3x5 B 4x10 B 3x10 3lbs   Bridges 3x10 3x10 3x10 · 3x10 normal  · x20 w/ SL march   Standing Hip ABD 2x10 B 2x10 25lbs 3x10 25lbs 3x10 25lbs   Hip Flexion  2x10 25lbs 3x10 25lbs 3x10 25lbs   Sidestepping  3x35ft green TB     Knee Extension Machine  3x10 35lbs     Heel Slides  x4min     Clams    3x10-15 B green TB    Squats   3x10    Step Ups   3x5 8\"    SL Balance    x6min on foam   Hip Extension    3x10 25lbs                                 Access Code: [email or web address]  URL: [email or web address]  Date: 2021  Prepared by:  Crystal Montez    Exercises  Supine Bridge  Supine Active Straight Leg Raise  Standing Hip Abduction    Manual Therapy: (0min) Done in order to improve joint and soft tissue mobility,reduce muscle guarding, and decrease muscle tone   Date:  3/8 Date:  3/11 Date:   Date:     Type Parameters      Joint Mobilization Knee: A-P, P-A, traction grades I-III Knee: A-P, P-A grades I-III     Soft Tissue Mobilization           Modalities: (-) Done in order to reduce swelling and pain    Assessment: Pt able to tolerate more exercise    Plan: continue per POC    Future Appointments   Date Time Provider Lisa Tanya   3/22/2021  8:00 AM Pily Elgin, PT [facility]   3/24/2021  8:00 AM Pily Elgin, PT [facility]   3/29/2021  8:00 AM Pily Elgin, PT [facility]   4/1/2021  8:00 AM Pily Elgin, PT [facility]   4/5/2021  8:00 AM Pily Elgin, PT [facility]   4/8/2021  8:00 AM Pily Elgin, PT [facility]   4/12/2021  8:00 AM Pily Elgin, PT [facility]   4/15/2021  8:00 AM Pily Elgin, PT [facility]       Genie Marker Time:  Units: 3TE      Marlena Siddiqi, PT, DPT, OCS    Visit Approval Visit # Therapist initials Date A NS / Cx < 24 hr >24 hr Cx Comments    1 WJ 3/8 [x]  [] [] Initial evaluation    2 WJ 3/11 [x] [] []     3 WJ 3/15 [x] [] []     4 WJ 3/18 [x] [] []        [] [] []        [] [] []        [] [] []        [] [] []        [] [] []        [] [] []        [] [] []        [] [] []        [] [] []        [] [] []        [] [] []        [] [] []        [] [] []        [] [] []

## 2021-03-22 ENCOUNTER — HOSPITAL ENCOUNTER (OUTPATIENT)
Dept: PHYSICAL THERAPY | Age: 56
Discharge: HOME | End: 2021-03-22
Payer: COMMERCIAL

## 2021-03-22 PROCEDURE — 97110 THERAPEUTIC EXERCISES: CPT

## 2021-03-29 ENCOUNTER — HOSPITAL ENCOUNTER (OUTPATIENT)
Dept: PHYSICAL THERAPY | Age: 56
Discharge: HOME | End: 2021-03-29
Payer: COMMERCIAL

## 2021-03-29 PROCEDURE — 97110 THERAPEUTIC EXERCISES: CPT

## 2021-03-29 NOTE — PROGRESS NOTES
Mary Beth Amrik  : 1965  Primary: Bimal Ayala Of Leonor Yangsaranya*  Secondary:  [facility]  [address], [address]  [phone]   [phone]         OUTPATIENT PHYSICAL THERAPY:Daily Note 3/29/2021      TREATMENT:   PT Patient Time In/Time Out  Time In: 0800  Time Out: 0845      Total Time: 45min  Visit Count:  6     ICD-10: Treatment Diagnosis: M17.11, M25.561  Medication Last Reviewed: 21      TREATMENT PLAN  Effective Dates: 3/8/2021 TO 2021 (90 days). Frequency/Duration: 2 times a week for 90 Day(s)         Subjective: Pt states his knee is doing well, things are getting easier and it isn't as painful   Pain: 1/10    Objective: Therapeutic Exercise: (45min) Done in order to improve strength, ROM and understanding of current condition. Date:  3/15 Date:  3/18 Date:  3/22 Date:  3/29   Activity/Exercise       Education       NuStep x10min lvl 4-5 x10min lvl 4 x10min lvl 4 x10min lvl 4   SLR 4x10 B 3x10 3lbs 3x15    Bridges 3x10 · 3x10 normal  · x20 w/ SL march 3x10    Standing Hip ABD 3x10 25lbs 3x10 25lbs 3x10 3x10 B 30lbs   Hip Flexion 3x10 25lbs 3x10 25lbs  3x10 B 30bs   Sidestepping    [id number] green TB   Knee Extension Machine       Heel Slides       Clams  3x10-15 B green TB      Squats 3x10  3x10 3x10 20lbs   Step Ups 3x5 8\"   3x5 8\" 10lbs per hand   SL Balance  x6min on foam     Hip Extension  3x10 25lbs 3x10 3x10 B 35lbs   4-Way Walkouts   x10 30lbs    Bike   x5min                    Access Code: [email or web address]  URL: [email or web address]  Date: 2021  Prepared by:  Chance Millard    Exercises  Supine Bridge  Supine Active Straight Leg Raise  Standing Hip Abduction    Manual Therapy: (0min) Done in order to improve joint and soft tissue mobility,reduce muscle guarding, and decrease muscle tone   Date:  3/8 Date:  3/11 Date:   Date:     Type Parameters      Joint Mobilization Knee: A-P, P-A, traction grades I-III Knee: A-P, P-A grades I-III     Soft Tissue Mobilization           Modalities: (-) Done in order to reduce swelling and pain    Assessment: Pt had no pain with exercise, able to add weight to squats and step ups    Plan: continue per POC    Future Appointments   Date Time Provider Lisa Martínez   4/1/2021  8:00 AM Kalyan Kaitlynn, PT [facility]   4/5/2021  8:00 AM Kalyan Kaitlynn, PT [facility]   4/8/2021  8:00 AM Kalyan Kaitlynn, PT [facility]   4/12/2021  8:00 AM Kalyan Kaitlynn, PT [facility]   4/15/2021  8:00 AM Kalyan Kaitlynn, PT [facility]       Bettey Villagomez Time:  Units: Richard Patricia, PT, DPT, OCS    Visit Approval Visit # Therapist initials Date A NS / Cx < 24 hr >24 hr Cx Comments    1 WJ 3/8 [x]  [] [] Initial evaluation    [facility] 3/11 [x] [] []     3 WJ 3/15 [x] [] []     4 WJ 3/18 [x] [] []     5 WJ 3/22 [x] [] []     N/S WJ 3/24 [] [x] [] Pt forgot his appt was on Wed, showed up on  Thurs    6 [facility] 3/29 [x] [] []     7   [] [] []     8   [] [] []     9   [] [] []     10   [] [] []        [] [] []        [] [] []        [] [] []        [] [] []        [] [] []        [] [] []        [] [] []        [] [] []

## 2021-04-01 ENCOUNTER — HOSPITAL ENCOUNTER (OUTPATIENT)
Dept: PHYSICAL THERAPY | Age: 56
Discharge: HOME | End: 2021-04-01
Payer: COMMERCIAL

## 2021-04-01 PROCEDURE — 97110 THERAPEUTIC EXERCISES: CPT

## 2021-04-01 NOTE — PROGRESS NOTES
Renata Vergara  : 1965  Primary: Suha Hansen Of Leonor Johnson*  Secondary:  [facility]  [address], [address]  [phone]   [phone]         OUTPATIENT PHYSICAL THERAPY:Daily Note 2021      TREATMENT:   PT Patient Time In/Time Out  Time In: 0803  Time Out: 0848      Total Time: 45min  Visit Count:  7     ICD-10: Treatment Diagnosis: M17.11, M25.561  Medication Last Reviewed: 21      TREATMENT PLAN  Effective Dates: 3/8/2021 TO 2021 (90 days). Frequency/Duration: 2 times a week for 90 Day(s)         Subjective: Pt states his knee is doing well   Pain: 1/10    Objective: Therapeutic Exercise: (45min) Done in order to improve strength, ROM and understanding of current condition. Date:  3/18 Date:  3/22 Date:  3/29 Date:     Activity/Exercise       Education       NuStep x10min lvl 4 x10min lvl 4 x10min lvl 4 x10min lvl 5   SLR 3x10 3lbs 3x15     Bridges · 3x10 normal  · x20 w/ SL march 3x10     Standing Hip ABD 3x10 25lbs 3x10 3x10 B 30lbs 3x10 B 30lbs   Hip Flexion 3x10 25lbs  3x10 B 30bs 3x10 B 30lbs   Sidestepping   [id number] green TB    Knee Extension Machine       Heel Slides       Clams        Squats  3x10 3x10 20lbs 3x10 (10lbs each hand) w/ lift   Step Ups   3x5 8\" 10lbs per hand 3x5 12\" B 10lbs per hand   SL Balance x6min on foam      Hip Extension 3x10 25lbs 3x10 3x10 B 35lbs    4-Way Walkouts  x10 30lbs     Bike  x5min  x5min   Lift & Carry    [id number] 25lbs   Walking Lunge    2x35ft 10lbs                                               Access Code: [email or web address]  URL: [email or web address]  Date: 2021  Prepared by:  Elba Mane    Exercises  Supine Bridge  Supine Active Straight Leg Raise  Standing Hip Abduction    Manual Therapy: (0min) Done in order to improve joint and soft tissue mobility,reduce muscle guarding, and decrease muscle tone   Date:  3/8 Date:  3/11 Date:   Date: Type Parameters      Joint Mobilization Knee: A-P, P-A, traction grades I-III Knee: A-P, P-A grades I-III     Soft Tissue Mobilization           Modalities: (-) Done in order to reduce swelling and pain    Assessment: Pt able to significantly increase difficulty of exercise and more work specific tasks    Plan: continue per POC    Future Appointments   Date Time Provider Lisa Tanya   4/5/2021  8:00 AM Sandro Comas, PT [facility]   4/8/2021  8:00 AM Sandro Comas, PT [facility]   4/12/2021  8:00 AM Sandro Comas, PT [facility]   4/15/2021  8:00 AM Sandro Comas, PT [facility]       Earla Journey Time:  Units: Sarai Curiel, PT, DPT, OCS    Visit Approval Visit # Therapist initials Date A NS / Cx < 24 hr >24 hr Cx Comments    1 WJ 3/8 [x]  [] [] Initial evaluation    2 Temecularo Mcdonough 3/11 [x] [] []     3 WJ 3/15 [x] [] []     4 WJ 3/18 [x] [] []     5 WJ 3/22 [x] [] []     N/S WJ 3/24 [] [x] [] Pt forgot his appt was on Wed, showed up on  Thurs    6 Temecularo Mcdonoughl 3/29 [x] [] []     7 Sadero Lirianowall 4/1 [x] [] []     8   [] [] []     9   [] [] []     10   [] [] []        [] [] []        [] [] []        [] [] []        [] [] []        [] [] []        [] [] []        [] [] []        [] [] []

## 2021-04-05 ENCOUNTER — HOSPITAL ENCOUNTER (OUTPATIENT)
Dept: PHYSICAL THERAPY | Age: 56
Discharge: HOME | End: 2021-04-05
Payer: COMMERCIAL

## 2021-04-05 PROCEDURE — 97110 THERAPEUTIC EXERCISES: CPT
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
## 2021-04-08 ENCOUNTER — HOSPITAL ENCOUNTER (OUTPATIENT)
Dept: PHYSICAL THERAPY | Age: 56
Discharge: HOME | End: 2021-04-08
Payer: COMMERCIAL

## 2021-04-08 PROCEDURE — 97110 THERAPEUTIC EXERCISES: CPT

## 2021-04-08 NOTE — PROGRESS NOTES
Sade Oshea  : 1965  Primary: Richard Donahue Of Leonor Yangsaranya*  Secondary:  [facility]  [address], [address]  [phone]   [phone]         OUTPATIENT PHYSICAL THERAPY:Daily Note 2021      TREATMENT:   PT Patient Time In/Time Out  Time In: 0800  Time Out: 0845      Total Time: 45min  Visit Count:  9     ICD-10: Treatment Diagnosis: M17.11, M25.561  Medication Last Reviewed: 21      TREATMENT PLAN  Effective Dates: 3/8/2021 TO 2021 (90 days). Frequency/Duration: 2 times a week for 90 Day(s)         Subjective: Pt states he is doing better today   Pain: 1/10    Objective: Therapeutic Exercise: (45min) Done in order to improve strength, ROM and understanding of current condition. Date:  3/29 Date:   Date:   Date:     Activity/Exercise       Education       NuStep x10min lvl 4 x10min lvl 5 x12min lvl 4-5 x12min lvl 5-6   SLR   3x20    Bridges   3x15    Standing Hip ABD 3x10 B 30lbs 3x10 B 30lbs 3x15 B 30lbs 3x10 B 37lbs   Hip Flexion 3x10 B 30bs 3x10 B 30lbs 3x15 B 30lbs 3x10 B 37lbs   Sidestepping [id number] green TB      Knee Extension Machine       Heel Slides       Clams        Squats 3x10 20lbs 3x10 (10lbs each hand) w/ lift  3x5 10lbs each hand   Step Ups 3x5 8\" 10lbs per hand 3x5 12\" B 10lbs per hand 3x5 B 14\" 3x10 10lbs each hand   SL Balance       Hip Extension 3x10 B 35lbs      4-Way Walkouts       Bike  x5min  x5min   Lift & Carry  [id number] 25lbs  [id number] 25lbs   Walking Lunge  2x35ft 10lbs                                                 Access Code: [email or web address]  URL: [email or web address]  Date: 2021  Prepared by:  David Pock    Exercises  Supine Bridge  Supine Active Straight Leg Raise  Standing Hip Abduction    Manual Therapy: (0min) Done in order to improve joint and soft tissue mobility,reduce muscle guarding, and decrease muscle tone   Date:  3/8 Date:  3/11 Date:   Date:     Type Parameters      Joint Mobilization Knee: A-P, P-A, traction grades I-III Knee: A-P, P-A grades I-III     Soft Tissue Mobilization           Modalities: (-) Done in order to reduce swelling and pain    Assessment: Pt able to perform more challenging work related tasks today without pain    Plan: continue per POC    Future Appointments   Date Time Provider Lisa Martínez   4/12/2021  8:00 AM Kameron Chaney, PT [facility]   4/15/2021  8:00 AM Kameron Chaney, PT [facility]       Kaden Tirado Time:  Units: [employer] Rosaline, PT, DPT, OCS    Visit Approval Visit # Therapist initials Date A NS / Cx < 24 hr >24 hr Cx Comments    1 WJ 3/8 [x]  [] [] Initial evaluation    2 WJ 3/11 [x] [] []     3 WJ 3/15 [x] [] []     4 WJ 3/18 [x] [] []     5 WJ 3/22 [x] [] []     N/S WJ 3/24 [] [x] [] Pt forgot his appt was on Wed, showed up on  Thurs    [address] 3/29 [x] [] []     7 WJ 4/1 [x] [] []     8 WJ 4/5 [x] [] []     9 WJ 4/8 [x] [] []     10   [] [] []        [] [] []        [] [] []        [] [] []        [] [] []        [] [] []        [] [] []        [] [] []        [] [] []

## 2021-04-12 ENCOUNTER — HOSPITAL ENCOUNTER (OUTPATIENT)
Dept: PHYSICAL THERAPY | Age: 56
Discharge: HOME | End: 2021-04-12
Payer: COMMERCIAL

## 2021-04-12 PROCEDURE — 97110 THERAPEUTIC EXERCISES: CPT

## 2021-04-12 NOTE — PROGRESS NOTES
Jayden Kathy  : 1965  Primary: Ivonne Tellez Of Leonor Johnson*  Secondary:  [facility]  [address], [address]  [phone]   [phone]         OUTPATIENT PHYSICAL THERAPY:Daily Note 2021      TREATMENT:   PT Patient Time In/Time Out  Time In: 0800  Time Out: 0845      Total Time: 45min  Visit Count:  10     ICD-10: Treatment Diagnosis: M17.11, M25.561  Medication Last Reviewed: 21      TREATMENT PLAN  Effective Dates: 3/8/2021 TO 2021 (90 days). Frequency/Duration: 2 times a week for 90 Day(s)         Subjective: Pt states he is doing well, no pain   Pain: 0/10    Objective: Therapeutic Exercise: (45min) Done in order to improve strength, ROM and understanding of current condition. Date:   Date:   Date:      Activity/Exercise       Education       NuStep x10min lvl 5 x12min lvl 4-5 x12min lvl 5-6 x12min lvl 6   SLR  3x20     Bridges  3x15     Standing Hip ABD 3x10 B 30lbs 3x15 B 30lbs 3x10 B 37lbs 3x15 B 37lbs   Hip Flexion 3x10 B 30lbs 3x15 B 30lbs 3x10 B 37lbs 3x15 B 37lbs   Sidestepping       Knee Extension Machine       Heel Slides       Clams        Squats 3x10 (10lbs each hand) w/ lift  3x5 10lbs each hand 3x10 10lbs each hand   Step Ups 3x5 12\" B 10lbs per hand 3x5 B 14\" 3x10 10lbs each hand 3x10 10lbs each hand   SL Balance       Hip Extension       4-Way Walkouts    2x10 40lbs (bwd)   Bike x5min  x5min x5min   Lift & Carry 1x373fv 25lbs  [id number] 25lbs    Walking Lunge 2x35ft 10lbs   2x30ft B 10lbs                                               Access Code: [email or web address]  URL: [email or web address]  Date: 2021  Prepared by:  Jad Poplar    Exercises  Supine Bridge  Supine Active Straight Leg Raise  Standing Hip Abduction    Manual Therapy: (0min) Done in order to improve joint and soft tissue mobility,reduce muscle guarding, and decrease muscle tone   Date:  3/8 Date:  3/11 Date:   Date:     Type Parameters      Joint Mobilization Knee: A-P, P-A, traction grades I-III Knee: A-P, P-A grades I-III     Soft Tissue Mobilization           Modalities: (-) Done in order to reduce swelling and pain    Assessment: Pt continues to progress load and difficulty of exercises without issue    Plan: continue per POC    Future Appointments   Date Time Provider Lisa Tanya   4/15/2021  8:00 AM Chani Ferro, PT [facility] Time:  Units: Nyasiadoris Lombardo, PT, DPT, OCS    Visit Approval Visit # Therapist initials Date A NS / Cx < 24 hr >24 hr Cx Comments    1 WJ 3/8 [x]  [] [] Initial evaluation    2 Cheryl Mosquera 3/11 [x] [] []     3 WJ 3/15 [x] [] []     4 WJ 3/18 [x] [] []     5 WJ 3/22 [x] [] []     N/S WJ 3/24 [] [x] [] Pt forgot his appt was on Wed, showed up on  Thurs    6 Cheryl Mosquera 3/29 [x] [] []     7 Cheryl Mosquera 4/1 [x] [] []     8 WJ 4/5 [x] [] []     9 WJ 4/8 [x] [] []     10 WJ 4/12 [x] [] []        [] [] []        [] [] []        [] [] []        [] [] []        [] [] []        [] [] []        [] [] []        [] [] []

## 2021-04-15 ENCOUNTER — HOSPITAL ENCOUNTER (OUTPATIENT)
Dept: PHYSICAL THERAPY | Age: 56
Discharge: HOME | End: 2021-04-15
Payer: COMMERCIAL

## 2021-04-15 PROCEDURE — 97110 THERAPEUTIC EXERCISES: CPT

## 2021-04-15 NOTE — THERAPY DISCHARGE
Samira Landrum : 1965 Primary: [facility] Secondary:  Therapy Center at [address] [place] [address], [address] [place] [address] [address], [address] [phone]   [phone] OUTPATIENT PHYSICAL THERAPY:Discharge Summary 4/15/2021 ICD-10: Treatment Diagnosis: M17.11, M25.561 Precautions/Allergies:  
Pork/porcine containing products TREATMENT PLAN: 
Effective Dates: Discharge MEDICAL/REFERRING DIAGNOSIS: 
Unilateral primary osteoarthritis, left knee [M17.12] DATE OF ONSET: 6mo ago REFERRING PHYSICIAN: Jas Gilbert MD MD Orders: Lori Ngo and treat Return MD Appointment:   
 
INITIAL ASSESSMENT:  Mr. Deangelo Sparks presents to physical therapy with MD diagnosis of a R knee pain. Pt demonstrated signs and symptoms consistent with this diagnosis. On objective examination, the patient demonstrated deficits in ROM, strength, joint mobility, soft tissue mobility. The patient also had increased pain. The patient is limited in the following activities: walking, standing, lifting, work, house work, ADLs, sleeping. The patient has a good  prognosis for recovery based on the examination findings and may be limited by: N/A. Patient requires skilled physical therapy services in order to return to prior level of function. DISCHARGE ASSESSMENT: Mr. Deangelo Sparks presented to physical therapy with MD diagnosis of a R knee pain. Pt demonstrated signs and symptoms consistent with this diagnosis. On objective examination, the patient demonstrated improvements in ROM, strength, joint/soft tissue mobility, functional ability. The patient also has decreased pain These improvements have increased the patient's ability to: lift, walk, stand, do stairs, perform ADLs, perform work tasks. The patient is still limited in the following activities: long-term walking/lifting (> 1hr). Patient is being discharged from therapy at this time due to achieving goals and being independent with HEP.  Patient is ready for return to work PROBLEM LIST (Impacting functional limitations): 1. None INTERVENTIONS PLANNED: (Treatment may consist of any combination of the following) 1. Balance Exercise 2. Bed Mobility 3. Gait Training 4. Heat 5. Home Exercise Program (HEP) 6. Manual Therapy 7. Neuromuscular Re-education/Strengthening 8. Range of Motion (ROM) 9. Therapeutic Activites 10. Therapeutic Exercise/Strengthening GOALS: (Goals have been discussed and agreed upon with patient.) Short-Term Functional Goals: Time Frame: 4 weeks 1. Pt will be compliant with progressive HEP-- goal met 2. Pt will have 125deg knee flexion-- goal met Discharge Goals: Time Frame: 10 weeks 1. Pt will have 5/5 knee extension strength-- goal met 2. Pt will improve KOOS Jr by 10pts-- goal not fully met 3. Pt will have 0deg knee extension-- goal met OUTCOME MEASURE:  
Tool Used: KOOS Jr 
Score:  Initial: 21/28 Most Recent: 13/28 (Date: 4/15/2021 ) Interpretation of Score: 20 questions each scored on a 5 point scale with 0 representing \"extreme difficulty or unable to perform\" and 4 representing \"no difficulty\". The lower the score, the greater the functional disability. 80/80 represents no disability. Minimal detectable change is 9 points. Tool Used: Knee Injury and Osteoarthritis Outcome Short Form (KOOS-12) SCORE: None (0) Mild (1) Moderate (2) Severe (3) Extreme (4) Stiffness: 1. How severe is your knee stiffness after first waking in the morning? [] [] [x] [] []  
Pain: What amount of knee pain have you experienced in the last week  
doing the following activies? 2. Twisting/pivoting on your knee: [] [] [x] [] [] 3. Straightening knee fully: [] [x] [] [] []  
4. Going up or down stairs [] [] [x] [] []  
5. Staning upright [] [] [x] [] [] Function, daily living 6. Rising from sitting [] [] [x] [] [] 7. Bending to floor/ an object [] [] [x] [] [] MEDICAL NECESSITY:  
· Patient is being discharged from therapy at this time due to achieving all goals. REASON FOR SERVICES/OTHER COMMENTS: 
· Patient is being discharged from therapy at this time due to achieving all goals. Total Duration: 
  
 
Rehabilitation Potential For Stated Goals: Good Regarding King Rosario's therapy, I certify that the treatment plan above will be carried out by a therapist or under their direction. Thank you for this referral, 
Jet Bourne PT, DPT, OCS Referring Physician Signature: Louisa Calzada MD No Signature is Required for this note. PAIN/SUBJECTIVE:  
Initial:   0/10 Post Session:  0/10 HISTORY:  
History of Injury/Illness (Reason for Referral): 
Pt states he has right knee pain. Pt states it has been bothering him for several months, but has gotten worse over the last few weeks. Pain is located on the outside of the R knee and at the kneecap. Pain is pretty constant, but will get worse if he sits in a low chair, turns on that leg. Stiff in the morning but feels better after a few minutes of moving. Aggs: standing, cold weather, sleeping on that side. Pain @ worst: 6/10, Pain @ best: 1/10. Past Medical History/Comorbidities:  
Mr. 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 He also has no past medical history of Arthritis, Asthma, Autoimmune disease ([address] CAD (coronary artery disease), Cancer ([address] Chronic kidney disease, COPD, Dementia, Diabetes ([address] Endocrine disease, Heart failure ([address] Hypertension, Infectious disease, Liver disease, Other ill-defined conditions, Psychiatric disorder, PUD (peptic ulcer disease), Sleep disorder, Stroke ([address] or Thromboembolus ([address] Mr. Kelle Saldana  has no past surgical history on file. Social History/Living Environment:  
  Lives alone Prior Level of Function/Work/Activity: 
Work:  (on and off lift) Activities: walking, house work Personal Factors:   
      Sex:  male Age:  63 y.o. Ambulatory/Rehab Services H2 Model Falls Risk Assessment Risk Factors: 
     (1)  Gender [Male] Ability to Rise from Chair: 
     (0)  Ability to rise in a single movement Falls Prevention Plan: No modifications necessary Total: (5 or greater = High Risk): 1 ©2010 [facility] [place] [address] [place] [address] [employer] Patent #[id number]. Federal Law prohibits the replication, distribution or use without written permission from [facility] [employer] [facility] Current Medications:   
  
Current Outpatient Medications:  
  meloxicam (MOBIC) 15 mg tablet, Take 1 Tab by mouth daily. , Disp: 30 Tab, Rfl: 2 Date Last Reviewed:  04/15/21 Number of Personal Factors/Comorbidities that affect the Plan of Care: 1-2: MODERATE COMPLEXITY EXAMINATION:  
*= Painful     WNL= within normal limits     NT= not tested Observation Gait: normal 
 
ROM Right Left Flexion 135 139 Extension 0 0 Strength (all MMT scores are graded on a scale of 0-5) Right Left Hip Flexion 5 5 Abduction 4+ 4+ Extension 4+ 5 Glute Max 5 5 Knee Extension 5 5 Flexion 5 5 Joint/Soft Tissue Mobility Description Joint Mobility  Knee: normal, no pain Soft Tissue Mobility Mild joint line tenderness Functional Mobility 30sec Sit to Stand: 14x DL Squat: no weight shift, no pain Picking up object from floor: squats down with both legs, no pain Body Structures Involved: 1. Bones 2. Joints 3. Muscles 4. Ligaments Body Functions Affected: 1. Sensory/Pain 2. Neuromusculoskeletal 
3. Movement Related Activities and Participation Affected: 1. General Tasks and Demands 2. Mobility 3. Self Care 4. Domestic Life 5. Interpersonal Interactions and Relationships 6. Community, Social and [place] Number of elements (examined above) that affect the Plan of Care: 3: MODERATE COMPLEXITY CLINICAL PRESENTATION:  
Presentation: Stable and uncomplicated: LOW COMPLEXITY CLINICAL DECISION MAKING:  
Use of outcome tool(s) and clinical judgement create a POC that gives a: Clear prediction of patient's progress: LOW COMPLEXITY Josie Mittal PT, DPT, OCS

## 2021-04-15 NOTE — PROGRESS NOTES
Cordell Gongora  : 1965  Primary: [facility]  Secondary:  Therapy Center at [address], [address]  [phone]   [phone]         OUTPATIENT PHYSICAL THERAPY:Daily Note 4/15/2021      TREATMENT:   PT Patient Time In/Time Out  Time In: 08  Time Out: 0845      Total Time: 45min  Visit Count:  11     ICD-10: Treatment Diagnosis: M17.11, M25.561  Medication Last Reviewed: 04/15/21      TREATMENT PLAN  Effective Dates: 3/8/2021 TO 2021 (90 days). Frequency/Duration: 2 times a week for 90 Day(s)         Subjective: See Discharge Summary dated 4/15/2021 for details   Pain: 0/10    Objective: See Discharge Summary dated 4/15/2021 for details      Therapeutic Exercise: (40min) Done in order to improve strength, ROM and understanding of current condition. Date:   Date:   Date:   Date:  4/15   Activity/Exercise       Education    D/C Assessment, HEP   NuStep x12min lvl 4-5 x12min lvl 5-6 x12min lvl 6 x12min lvl 6   SLR 3x20      Bridges 3x15      Standing Hip ABD 3x15 B 30lbs 3x10 B 37lbs 3x15 B 37lbs 3x10 B green TB   Hip Flexion 3x15 B 30lbs 3x10 B 37lbs 3x15 B 37lbs 3x10 B green TB   Sidestepping    5x35ft green TB   Knee Extension Machine       Heel Slides       Clams        Squats  3x5 10lbs each hand 3x10 10lbs each hand    Step Ups 3x5 B 14\" 3x10 10lbs each hand 3x10 10lbs each hand 2x10 B 14\"   SL Balance       Hip Extension       4-Way Walkouts   2x10 40lbs (bwd)    Bike  x5min x5min    Lift & Carry  [id number] 25lbs     Walking Lunge   2x30ft B 10lbs 2x30ft                                               Access Code: [email or web address]  URL: [email or web address]  Date: 2021  Prepared by:  Isrrael Burk    Exercises  Supine Bridge  Supine Active Straight Leg Raise  Standing Hip Abduction    Manual Therapy: (0min) Done in order to improve joint and soft tissue mobility,reduce muscle guarding, and decrease muscle tone   Date:  3/8 Date:  3/11 Date:   Date:     Type Parameters      Joint Mobilization Knee: A-P, P-A, traction grades I-III Knee: A-P, P-A grades I-III     Soft Tissue Mobilization           Modalities: (-) Done in order to reduce swelling and pain    Assessment: See Discharge Summary dated 4/15/2021 for details    Plan: See Discharge Summary dated 4/15/2021 for details    Future Appointments   Date Time Provider Lisa Tanya   5/5/2021  8:00 AM SPC COVID VACCINE 21 DAY [facility] SPC SPC       Unbilled Time:  Units: Kalpana Banister, PT, DPT, OCS    Visit Approval Visit # Therapist initials Date A NS / Cx < 24 hr >24 hr Cx Comments    1 WJ 3/8 [x]  [] [] Initial evaluation    2 WJ 3/11 [x] [] []     3 WJ 3/15 [x] [] []     4 WJ 3/18 [x] [] []     5 WJ 3/22 [x] [] []     N/S WJ 3/24 [] [x] [] Pt forgot his appt was on Wed, showed up on  Thurs    [address] 3/29 [x] [] []     7 WJ 4/1 [x] [] []     8 WJ 4/5 [x] [] []     9 WJ 4/8 [x] [] []     10 WJ 4/12 [x] [] []     11 WJ 4/15 [x] [] [] discharge       [] [] []        [] [] []        [] [] []        [] [] []        [] [] []        [] [] []        [] [] []

## 2021-10-04 ENCOUNTER — HOSPITAL ENCOUNTER (OUTPATIENT)
Dept: LAB | Age: 56
Discharge: HOME | End: 2021-10-04

## 2021-10-04 PROCEDURE — 88305 TISSUE EXAM BY PATHOLOGIST: CPT

## 2022-03-18 PROBLEM — M25.562 PAIN IN BOTH KNEES: Status: ACTIVE | Noted: 2017-07-07

## 2022-03-18 PROBLEM — M25.561 PAIN IN BOTH KNEES: Status: ACTIVE | Noted: 2017-07-07

## 2022-03-19 PROBLEM — G43.909 MIGRAINE: Status: ACTIVE | Noted: 2017-07-07

## 2022-03-19 PROBLEM — E83.111 HEMOCHROMATOSIS DUE TO REPEATED RED BLOOD CELL TRANSFUSIONS: Status: ACTIVE | Noted: 2017-07-07

## 2022-03-19 PROBLEM — M54.9 BACK PAIN: Status: ACTIVE | Noted: 2017-07-07

## 2023-05-10 RX ORDER — MELOXICAM 15 MG/1
15 TABLET ORAL DAILY
COMMUNITY
Start: 2017-07-07 | End: 2023-05-26 | Stop reason: ALTCHOICE

## 2023-05-26 ENCOUNTER — OFFICE VISIT (OUTPATIENT)
Dept: ORTHOPEDIC SURGERY | Age: 58
End: 2023-05-26
Payer: COMMERCIAL

## 2023-05-26 VITALS — WEIGHT: 190 LBS

## 2023-05-26 DIAGNOSIS — M25.562 LEFT KNEE PAIN, UNSPECIFIED CHRONICITY: Primary | ICD-10-CM

## 2023-05-26 PROBLEM — I10 ESSENTIAL HYPERTENSION: Status: ACTIVE | Noted: 2018-03-28

## 2023-05-26 PROCEDURE — 99204 OFFICE O/P NEW MOD 45 MIN: CPT | Performed by: SPECIALIST/TECHNOLOGIST

## 2023-05-26 RX ORDER — DICLOFENAC SODIUM 75 MG/1
75 TABLET, DELAYED RELEASE ORAL 2 TIMES DAILY
Qty: 60 TABLET | Refills: 0 | Status: SHIPPED | OUTPATIENT
Start: 2023-05-26

## 2023-05-26 NOTE — PROGRESS NOTES
Name: Jordyn Cohen  YOB: 1965  Gender: male  MRN: [id number]      CC: Knee Pain (L)       HPI: Jordyn Cohen is a 61 y.o. male who presents with Knee Pain (L)  Amy Iniguez is a new patient who presents today with pain in his L knee. He reports this pain began about 1.5 years ago, and he believes it to be compensatory pain from when his R knee was bothering him. He reports the pain to be on the medial and lateral aspect of the knee. He presents today with an antalgic gait. He does report instability, and a painful popping within the joint. He reports effusion within the joint. He has no history of significant injury to this knee. ROS/Meds/PSH/PMH/FH/SH: I personally reviewed the patients standard intake form. Below are the pertinents    Tobacco:  reports that he has been smoking. He has been smoking an average of 1 pack per day. He has never used smokeless tobacco.  Diabetes: none  Other: History of seizure, HTN, migraine    Physical Examination:  General: no acute distress  Lungs: breathing easily  CV: regular rhythm by pulse  Left Knee: Minimal effusion present. Tenderness to palpation both the medial and lateral joint line but more significantly in the medial joint line. Full active and passive range of motion with pain in the extremes of motion. Negative ligamentous exam x4. Positive Coy's with recreation of patient's symptoms medial joint line. Positive bounce home test.      Imaging:   Knee XR: 4 views     Clinical Indication  1. Left knee pain, unspecified chronicity           Report: AP, lateral, PA flexion, sunrise views of the Left knee demonstrates no acute fracture dislocation mild degenerative changes in the medial joint line    Impression: Mild DJD as above           All imaging interpreted by myself FREDDY Guzman independent of radiology review        Assessment:   1. Left knee pain, unspecified chronicity         Plan:    Think the majority the patient's left knee

## 2023-06-09 ENCOUNTER — OFFICE VISIT (OUTPATIENT)
Dept: ORTHOPEDIC SURGERY | Age: 58
End: 2023-06-09

## 2023-06-09 DIAGNOSIS — S83.242D TEAR OF MEDIAL MENISCUS OF LEFT KNEE, CURRENT, UNSPECIFIED TEAR TYPE, SUBSEQUENT ENCOUNTER: ICD-10-CM

## 2023-06-09 DIAGNOSIS — S83.282D TEAR OF LATERAL MENISCUS OF LEFT KNEE, CURRENT, UNSPECIFIED TEAR TYPE, SUBSEQUENT ENCOUNTER: ICD-10-CM

## 2023-06-09 DIAGNOSIS — M25.562 LEFT KNEE PAIN, UNSPECIFIED CHRONICITY: ICD-10-CM

## 2023-06-09 DIAGNOSIS — M17.12 PRIMARY OSTEOARTHRITIS OF LEFT KNEE: Primary | ICD-10-CM

## 2023-06-09 RX ORDER — DICLOFENAC SODIUM 75 MG/1
75 TABLET, DELAYED RELEASE ORAL 2 TIMES DAILY
Qty: 60 TABLET | Refills: 0 | Status: SHIPPED | OUTPATIENT
Start: 2023-06-09

## 2023-06-09 RX ORDER — TRIAMCINOLONE ACETONIDE 40 MG/ML
40 INJECTION, SUSPENSION INTRA-ARTICULAR; INTRAMUSCULAR ONCE
Status: COMPLETED | OUTPATIENT
Start: 2023-06-09 | End: 2023-06-09

## 2023-06-09 RX ADMIN — TRIAMCINOLONE ACETONIDE 40 MG: 40 INJECTION, SUSPENSION INTRA-ARTICULAR; INTRAMUSCULAR at 11:19

## 2023-06-09 NOTE — PROGRESS NOTES
patient conservative treatment options to include corticosteroid injection and viscosupplementation. I think he would benefit from a corticosteroid injection which she agreed to proceed with today. I think he would also benefit from viscosupplementation and we will get approval for this and he will return to start the viscosupplementation series. If he does not get benefit from the injections then I would recommend referral to total joints. The patient elected to proceed with an intraarticular knee injection today. After verbal informed consent was obtained after sterile prep the Left knee  was injected from a anterior lateral approach with 4 cc of 0.25% Marcaine and 1 cc of 40 mg Kenalog. The patient tolerated the procedure well was given postinjection flare precautions.     Malena Gomez, APRN - CNP    Orthopaedics and Sports Medicine

## 2023-07-21 ENCOUNTER — OFFICE VISIT (OUTPATIENT)
Dept: ORTHOPEDIC SURGERY | Age: 58
End: 2023-07-21

## 2023-07-21 DIAGNOSIS — M17.12 PRIMARY OSTEOARTHRITIS OF LEFT KNEE: Primary | ICD-10-CM

## 2023-07-21 RX ORDER — HYALURONATE SODIUM 10 MG/ML
20 SYRINGE (ML) INTRAARTICULAR ONCE
Status: COMPLETED | OUTPATIENT
Start: 2023-07-21 | End: 2023-07-21

## 2023-07-21 RX ADMIN — Medication 20 MG: at 11:23

## 2023-07-21 NOTE — PROGRESS NOTES
Name: Jonna White  YOB: 1965  Gender: male  MRN: [id number]      CC: Knee Pain (L)       HPI: Jonna White is a 61 y.o. male who presents with Knee Pain (L)  Massimo Tamayo is here for his first Euflexxa injection. He reports that he got about 1 week relief from the corticosteroid injection. He reports that he is leaning towards consideration of a knee replacement however he wants to give the viscosupplementation a chance to improve his knee pain. He agreed to proceed with the first injection today. Physical Examination:  General: no acute distress  left Knee: Exam is unchanged, the knee continues to be painful with palpation and range of motion. There is no effusion or concern for infection. Assessment:   1. Primary osteoarthritis of left knee          Plan:     L knee injected from a anterior lateral approach with Euflexxa viscosupplementation after sterile prep. Patient tolerated the procedure well was given postinjection flare precautions.       Follow up 1 week repeat injection      FREDDY Sepulveda    Orthopaedics and Sports Medicine

## 2023-08-04 ENCOUNTER — OFFICE VISIT (OUTPATIENT)
Dept: ORTHOPEDIC SURGERY | Age: 58
End: 2023-08-04
Payer: COMMERCIAL

## 2023-08-04 DIAGNOSIS — M17.12 PRIMARY OSTEOARTHRITIS OF LEFT KNEE: Primary | ICD-10-CM

## 2023-08-04 PROCEDURE — 20610 DRAIN/INJ JOINT/BURSA W/O US: CPT | Performed by: SPECIALIST/TECHNOLOGIST

## 2023-08-04 RX ORDER — HYALURONATE SODIUM 10 MG/ML
20 SYRINGE (ML) INTRAARTICULAR ONCE
Status: COMPLETED | OUTPATIENT
Start: 2023-08-04 | End: 2023-08-04

## 2023-08-04 RX ADMIN — Medication 20 MG: at 11:41

## 2023-08-04 NOTE — PROGRESS NOTES
Name: General Davey  YOB: 1965  Gender: male  MRN: [id number]      CC: Knee Pain (L)       HPI: General Davey is a 61 y.o. male who presents with Knee Pain (L)  . He reports that he is having significant knee pain but does wish to proceed with a second injection. Physical Examination:  General: no acute distress  left Knee: Exam is unchanged, the knee continues to be painful with palpation and range of motion. There is no effusion or concern for infection. Assessment:   1. Primary osteoarthritis of left knee          Plan:     Left knee injected from a anterior lateral approach with 2 cc Euflexxa viscosupplementation after sterile prep. Patient tolerated the procedure well was given postinjection flare precautions. After completion of the second injection the patient requested to discuss possible total knee replacement. He reports that after completion of the second injection he does not wish to proceed with the third injection and wishes to be referred to surgery for surgical consultation. I explained to the patient that he would not be able to have surgery for 6 months after his most recent injection and he voiced understanding. We will arrange a referral to total joints and he will see me as needed.     Follow up with total joints      FREDDY Lewis    Orthopaedics and Sports Medicine

## 2023-08-09 NOTE — PROGRESS NOTES
Name: Niles Taylor  YOB: 1965  Gender: male  MRN: [id number]      CC: Knee Pain (L)       HPI: Niles Taylor is a 61 y.o. male who is here to proceed with his third injection. Reports he does wish to proceed with the third round of injections however he still wishes to proceed with referral to total joints. Physical Examination:  General: no acute distress  left Knee: Exam is unchanged, the knee continues to be painful with palpation and range of motion. There is no effusion or concern for infection. Assessment:   1. Primary osteoarthritis of left knee          Plan:     Left knee injected from a anterior lateral approach with 2 mL Euflexxa viscosupplementation after sterile prep. Patient tolerated the procedure well was given postinjection flare precautions.       Follow up       FREDDY Lopez    Orthopaedics and Sports Medicine

## 2023-08-11 ENCOUNTER — OFFICE VISIT (OUTPATIENT)
Dept: ORTHOPEDIC SURGERY | Age: 58
End: 2023-08-11
Payer: COMMERCIAL

## 2023-08-11 DIAGNOSIS — M17.12 PRIMARY OSTEOARTHRITIS OF LEFT KNEE: Primary | ICD-10-CM

## 2023-08-11 PROCEDURE — 20610 DRAIN/INJ JOINT/BURSA W/O US: CPT | Performed by: SPECIALIST/TECHNOLOGIST

## 2023-08-11 RX ORDER — HYALURONATE SODIUM 10 MG/ML
20 SYRINGE (ML) INTRAARTICULAR ONCE
Status: COMPLETED | OUTPATIENT
Start: 2023-08-11 | End: 2023-08-11

## 2023-08-11 RX ADMIN — Medication 20 MG: at 10:23

## 2023-09-01 ENCOUNTER — OFFICE VISIT (OUTPATIENT)
Dept: ORTHOPEDIC SURGERY | Age: 58
End: 2023-09-01

## 2023-09-01 DIAGNOSIS — M25.562 LEFT KNEE PAIN, UNSPECIFIED CHRONICITY: Primary | ICD-10-CM

## 2023-09-01 RX ORDER — DICLOFENAC SODIUM 75 MG/1
75 TABLET, DELAYED RELEASE ORAL 2 TIMES DAILY
Qty: 60 TABLET | Refills: 3 | Status: SHIPPED | OUTPATIENT
Start: 2023-09-01

## 2023-09-13 DIAGNOSIS — M17.12 PRIMARY OSTEOARTHRITIS OF LEFT KNEE: Primary | ICD-10-CM

## 2023-09-13 DIAGNOSIS — M21.962 ACQUIRED DEFORMITY OF LEFT KNEE: ICD-10-CM

## 2023-10-18 ENCOUNTER — CLINICAL DOCUMENTATION (OUTPATIENT)
Dept: ORTHOPEDIC SURGERY | Age: 58
End: 2023-10-18

## 2023-10-23 ENCOUNTER — CLINICAL DOCUMENTATION (OUTPATIENT)
Dept: ORTHOPEDIC SURGERY | Age: 58
End: 2023-10-23

## 2023-10-31 ENCOUNTER — HOSPITAL ENCOUNTER (OUTPATIENT)
Dept: REHABILITATION | Age: 58
Discharge: HOME | End: 2023-11-03
Payer: COMMERCIAL

## 2023-10-31 ENCOUNTER — HOSPITAL ENCOUNTER (OUTPATIENT)
Dept: SURGERY | Age: 58
Discharge: HOME | End: 2023-11-03
Payer: COMMERCIAL

## 2023-10-31 VITALS
HEART RATE: 97 BPM | TEMPERATURE: 98.2 F | BODY MASS INDEX: 30.67 KG/M2 | RESPIRATION RATE: 16 BRPM | OXYGEN SATURATION: 100 % | HEIGHT: 66 IN | SYSTOLIC BLOOD PRESSURE: 117 MMHG | DIASTOLIC BLOOD PRESSURE: 83 MMHG

## 2023-10-31 DIAGNOSIS — R06.83 SNORING: Primary | ICD-10-CM

## 2023-10-31 DIAGNOSIS — Z01.818 PRE-OP EVALUATION: ICD-10-CM

## 2023-10-31 LAB
ALBUMIN SERPL-MCNC: 3.4 G/DL (ref 3.5–5)
ANION GAP SERPL CALC-SCNC: 7 MMOL/L (ref 2–11)
BASOPHILS # BLD: 0 K/UL (ref 0–0.2)
BASOPHILS NFR BLD: 0 % (ref 0–2)
BUN SERPL-MCNC: 15 MG/DL (ref 6–23)
CALCIUM SERPL-MCNC: 8.7 MG/DL (ref 8.3–10.4)
CHLORIDE SERPL-SCNC: 108 MMOL/L (ref 101–110)
CO2 SERPL-SCNC: 26 MMOL/L (ref 21–32)
CREAT SERPL-MCNC: 0.96 MG/DL (ref 0.8–1.5)
DIFFERENTIAL METHOD BLD: NORMAL
EKG ATRIAL RATE: 88 BPM
EKG DIAGNOSIS: NORMAL
EKG P AXIS: 20 DEGREES
EKG P-R INTERVAL: 172 MS
EKG Q-T INTERVAL: 350 MS
EKG QRS DURATION: 92 MS
EKG QTC CALCULATION (BAZETT): 423 MS
EKG R AXIS: 41 DEGREES
EKG T AXIS: 1 DEGREES
EKG VENTRICULAR RATE: 88 BPM
EOSINOPHIL # BLD: 0.1 K/UL (ref 0–0.8)
EOSINOPHIL NFR BLD: 1 % (ref 0.5–7.8)
ERYTHROCYTE [DISTWIDTH] IN BLOOD BY AUTOMATED COUNT: 12.9 % (ref 11.9–14.6)
EST. AVERAGE GLUCOSE BLD GHB EST-MCNC: 111 MG/DL
GLUCOSE SERPL-MCNC: 101 MG/DL (ref 65–100)
HBA1C MFR BLD: 5.5 % (ref 4.8–5.6)
HCT VFR BLD AUTO: 43.5 % (ref 41.1–50.3)
HGB BLD-MCNC: 14.6 G/DL (ref 13.6–17.2)
IMM GRANULOCYTES # BLD AUTO: 0 K/UL (ref 0–0.5)
IMM GRANULOCYTES NFR BLD AUTO: 0 % (ref 0–5)
INR PPP: 1.1
LYMPHOCYTES # BLD: 2.2 K/UL (ref 0.5–4.6)
LYMPHOCYTES NFR BLD: 26 % (ref 13–44)
MCH RBC QN AUTO: 30.7 PG (ref 26.1–32.9)
MCHC RBC AUTO-ENTMCNC: 33.6 G/DL (ref 31.4–35)
MCV RBC AUTO: 91.6 FL (ref 82–102)
MONOCYTES # BLD: 0.6 K/UL (ref 0.1–1.3)
MONOCYTES NFR BLD: 7 % (ref 4–12)
MRSA DNA SPEC QL NAA+PROBE: NOT DETECTED
NEUTS SEG # BLD: 5.6 K/UL (ref 1.7–8.2)
NEUTS SEG NFR BLD: 65 % (ref 43–78)
NRBC # BLD: 0 K/UL (ref 0–0.2)
PLATELET # BLD AUTO: 213 K/UL (ref 150–450)
PMV BLD AUTO: 10.8 FL (ref 9.4–12.3)
POTASSIUM SERPL-SCNC: 3.6 MMOL/L (ref 3.5–5.1)
PROTHROMBIN TIME: 14 SEC (ref 12.6–14.3)
RBC # BLD AUTO: 4.75 M/UL (ref 4.23–5.6)
S AUREUS CPE NOSE QL NAA+PROBE: NOT DETECTED
SODIUM SERPL-SCNC: 141 MMOL/L (ref 133–143)
WBC # BLD AUTO: 8.5 K/UL (ref 4.3–11.1)

## 2023-10-31 PROCEDURE — 80048 BASIC METABOLIC PNL TOTAL CA: CPT

## 2023-10-31 PROCEDURE — 97161 PT EVAL LOW COMPLEX 20 MIN: CPT

## 2023-10-31 PROCEDURE — 94760 N-INVAS EAR/PLS OXIMETRY 1: CPT

## 2023-10-31 PROCEDURE — 85025 COMPLETE CBC W/AUTO DIFF WBC: CPT

## 2023-10-31 PROCEDURE — 82040 ASSAY OF SERUM ALBUMIN: CPT

## 2023-10-31 PROCEDURE — 83036 HEMOGLOBIN GLYCOSYLATED A1C: CPT

## 2023-10-31 PROCEDURE — 87641 MR-STAPH DNA AMP PROBE: CPT

## 2023-10-31 PROCEDURE — 93010 ELECTROCARDIOGRAM REPORT: CPT | Performed by: INTERNAL MEDICINE

## 2023-10-31 PROCEDURE — 85610 PROTHROMBIN TIME: CPT

## 2023-10-31 PROCEDURE — 80307 DRUG TEST PRSMV CHEM ANLYZR: CPT

## 2023-10-31 PROCEDURE — 93005 ELECTROCARDIOGRAM TRACING: CPT

## 2023-10-31 ASSESSMENT — KOOS JR
STANDING UPRIGHT: 4
GOING UP OR DOWN STAIRS: 4
RISING FROM SITTING: 4
HOW SEVERE IS YOUR KNEE STIFFNESS AFTER FIRST WAKING IN MORNING: 4
BENDING TO THE FLOOR TO PICK UP OBJECT: 4
KOOS JR TOTAL INTERVAL SCORE: 8.291
STRAIGHTENING KNEE FULLY: 3
TWISING OR PIVOTING ON KNEE: 4

## 2023-10-31 ASSESSMENT — PROMIS GLOBAL HEALTH SCALE
SUM OF RESPONSES TO QUESTIONS 2, 4, 5, & 10: 16
IN GENERAL, WOULD YOU SAY YOUR QUALITY OF LIFE IS...[ON A SCALE OF 1 (POOR) TO 5 (EXCELLENT)]: 3
IN GENERAL, HOW WOULD YOU RATE YOUR PHYSICAL HEALTH [ON A SCALE OF 1 (POOR) TO 5 (EXCELLENT)]?: 2
IN GENERAL, HOW WOULD YOU RATE YOUR SATISFACTION WITH YOUR SOCIAL ACTIVITIES AND RELATIONSHIPS [ON A SCALE OF 1 (POOR) TO 5 (EXCELLENT)]?: 4
WHO IS THE PERSON COMPLETING THE PROMIS V1.1 SURVEY?: 0
IN THE PAST 7 DAYS, HOW OFTEN HAVE YOU BEEN BOTHERED BY EMOTIONAL PROBLEMS, SUCH AS FEELING ANXIOUS, DEPRESSED, OR IRRITABLE [ON A SCALE FROM 1 (NEVER) TO 5 (ALWAYS)]?: 5
HOW IS THE PROMIS V1.1 BEING ADMINISTERED?: 0
IN GENERAL, HOW WOULD YOU RATE YOUR MENTAL HEALTH, INCLUDING YOUR MOOD AND YOUR ABILITY TO THINK [ON A SCALE OF 1 (POOR) TO 5 (EXCELLENT)]?: 4
IN GENERAL, WOULD YOU SAY YOUR HEALTH IS...[ON A SCALE OF 1 (POOR) TO 5 (EXCELLENT)]: 3
IN THE PAST 7 DAYS, HOW WOULD YOU RATE YOUR PAIN ON AVERAGE [ON A SCALE FROM 0 (NO PAIN) TO 10 (WORST IMAGINABLE PAIN)]?: 10
TO WHAT EXTENT ARE YOU ABLE TO CARRY OUT YOUR EVERYDAY PHYSICAL ACTIVITIES SUCH AS WALKING, CLIMBING STAIRS, CARRYING GROCERIES, OR MOVING A CHAIR [ON A SCALE OF 1 (NOT AT ALL) TO 5 (COMPLETELY)]?: 1
SUM OF RESPONSES TO QUESTIONS 3, 6, 7, & 8: 16
IN GENERAL, PLEASE RATE HOW WELL YOU CARRY OUT YOUR USUAL SOCIAL ACTIVITIES (INCLUDES ACTIVITIES AT HOME, AT WORK, AND IN YOUR COMMUNITY, AND RESPONSIBILITIES AS A PARENT, CHILD, SPOUSE, EMPLOYEE, FRIEND, ETC) [ON A SCALE OF 1 (POOR) TO 5 (EXCELLENT)]?: 5
IN THE PAST 7 DAYS, HOW WOULD YOU RATE YOUR FATIGUE ON AVERAGE [ON A SCALE FROM 1 (NONE) TO 5 (VERY SEVERE)]?: 3

## 2023-10-31 ASSESSMENT — PAIN SCALES - GENERAL
PAINLEVEL_OUTOF10: 0
PAINLEVEL_OUTOF10: 10

## 2023-10-31 ASSESSMENT — PULMONARY FUNCTION TESTS
FEV1 (LITERS): 2.92
FEV1 (%PREDICTED): 112

## 2023-10-31 ASSESSMENT — PAIN DESCRIPTION - ORIENTATION: ORIENTATION: LEFT;ANTERIOR;INNER;OUTER

## 2023-10-31 ASSESSMENT — PAIN DESCRIPTION - DESCRIPTORS: DESCRIPTORS: THROBBING

## 2023-10-31 ASSESSMENT — PAIN DESCRIPTION - LOCATION: LOCATION: KNEE

## 2023-10-31 NOTE — PROGRESS NOTES
Vito Hudson  : 1965  Primary: [facility]  Secondary:  [facility]  [address] [facility], [address]  [phone]      Physical Therapy Prehab Evaluation Summary:10/31/2023   Time In/Out   PT Charge Capture  Episode     MEDICAL/REFERRING DIAGNOSIS: Unilateral primary osteoarthritis, left knee [M17.12]  REFERRING PHYSICIAN: Arleth Jaquez MD    Treatment Diagnosis:   Pain in Left Knee (M25.562)  Stiffness of Left Knee, Not elsewhere classified (X35.530)    DATE OF SURGERY: 23  Assessment:   COMMENTS:  Mr. Ronn Rosario is present for a Prehab Physical Therapy Assessment for their upcoming left TKA. They are here alone. After discussing the surgical admission options and discharge plans, they are planning on discharging on day of surgery. He needs to have a R TKA in time. He is wearing B knees braces with metal hinges. He will go home on day of surgery and wife will offer assist at NH. PROBLEM LIST:   (Impacting functional limitations):  Mr. Ronn Rosario presents with the following lower extremity(s) problems:  Strength  Range of Motion  Home Exercise Program  Pain INTERVENTIONS PLANNED:   (Benefits and precautions of physical therapy have been discussed with the patient.)  Home Exercise Program  Educational Discussion       GOALS: (Goals have been discussed and agreed upon with patient.)  Discharge Goals: Time Frame: 1 Day  Patient will demonstrate independence with a home exercise program designed to increase strength, range of motion, and pain control to minimize functional deficits and optimize patient for total joint replacement. Subjective:   Past Medical History/Comorbidities:   Mr. 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   Mr. Ronn Rosario  has no past surgical history on

## 2023-10-31 NOTE — PERIOP NOTE
The below lab results are within anesthesia guidelines, no follow-up required. Labs automatically routed to ordering provider via Epic documentation.      Latest Reference Range & Units 10/31/23 07:29   Sodium 133 - 143 mmol/L 141   Potassium 3.5 - 5.1 mmol/L 3.6   Chloride 101 - 110 mmol/L 108   CO2 21 - 32 mmol/L 26   BUN,BUNPL 6 - 23 MG/DL 15   Creatinine 0.8 - 1.5 MG/DL 0.96   Anion Gap 2 - 11 mmol/L 7   Est, Glom Filt Rate >60 ml/min/1.73m2 >60   Glucose, Random 65 - 100 mg/dL 101 (H)   CALCIUM, SERUM, 988523 8.3 - 10.4 MG/DL 8.7   Albumin 3.5 - 5.0 g/dL 3.4 (L)   Hemoglobin A1C 4.8 - 5.6 % 5.5   eAG (mg/dL) mg/dL 111   WBC 4.3 - 11.1 K/uL 8.5   RBC 4.23 - 5.6 M/uL 4.75   Hemoglobin Quant 13.6 - 17.2 g/dL 14.6   Hematocrit 41.1 - 50.3 % 43.5   MCV 82.0 - 102.0 FL 91.6   MCH 26.1 - 32.9 PG 30.7   MCHC 31.4 - 35.0 g/dL 33.6   MPV 9.4 - 12.3 FL 10.8   RDW 11.9 - 14.6 % 12.9   Platelet Count 907 - 450 K/uL 213   Neutrophils % 43 - 78 % 65   Lymphocyte % 13 - 44 % 26   Monocytes % 4.0 - 12.0 % 7   Eosinophils % 0.5 - 7.8 % 1   Basophils % 0.0 - 2.0 % 0   Neutrophils Absolute 1.7 - 8.2 K/UL 5.6   Lymphocytes Absolute 0.5 - 4.6 K/UL 2.2   Monocytes Absolute 0.1 - 1.3 K/UL 0.6   Eosinophils Absolute 0.0 - 0.8 K/UL 0.1   Basophils Absolute 0.0 - 0.2 K/UL 0.0   Differential Type -   AUTOMATED   Immature Granulocytes 0.0 - 5.0 % 0   Nucleated Red Blood Cells 0.0 - 0.2 K/uL 0.00   Absolute Immature Granulocyte 0.0 - 0.5 K/UL 0.0   Prothrombin Time 12.6 - 14.3 sec 14.0   INR -   1.1   MRSA/STAPH AUREUS DNA  Rpt   (H): Data is abnormally high  (L): Data is abnormally low  Rpt: View report in Results Review for more information

## 2023-10-31 NOTE — PERIOP NOTE
Patient verified name and . Order for consent found in EHR and matches case posting; patient verified. Type 3 surgery, joint assessment complete. Labs per surgeon: CBC,BMP, PT/PTT, HgbA1c, Albumin, Nicotine; results pending. Labs per anesthesia protocol: no additional  EKG: Completed today; results within anesthesia guidelines. Pt states he is a smoker and smoked prior to assessment--staff message sent to Pepe Das, assistant to Dr. Steven Barba, informing of the above. MRSA/MSSA swab collected; pharmacy to review and dose antibiotic as appropriate. Hospital approved surgical skin cleanser and instructions to return bottle on DOS given per hospital policy. Patient provided with handouts including Guide to Surgery, Pain Management, Hand Hygiene, Blood Transfusion Education, and [facility] Brochure. Patient answered medical/surgical history questions at their best of ability. All prior to admission medications documented in [facility]. Original medication prescription bottle NOT visualized during patient appointment. Patient instructed to hold all vitamins, supplements, herbal, marijuana 3 weeks prior to surgery and NSAIDS 5 days prior to surgery. Patient teach back successful and patient demonstrates knowledge of instruction.

## 2023-10-31 NOTE — PERIOP NOTE
PLEASE CONTINUE TAKING ALL PRESCRIPTION MEDICATIONS UP TO THE DAY OF SURGERY UNLESS OTHERWISE DIRECTED BELOW. DISCONTINUE all over the counter vitamins, supplements, and herbals 21 days prior to surgery. DISCONTINUE Non-Steroidal Anti-Inflammatory (NSAIDS) such as Advil, Ibuprofen, Diclofenac, Motrin, Naproxen, and Aleve 5 days prior to surgery. *IT IS OK TO TAKE TYLENOL, Allergy medication, and indigestion medications*     Additional medications to HOLD     Hold Doans 5 days prior to surgery     Hold marijuana from today forward           CONTINUE all other prescriptions like normal up until the day of surgery--TAKE [address]. NONE               Comments   The day before surgery please take 2 Tylenol in the morning and then again before bed. You may use either regular or extra strength. Bring Dynahex soap and incentive spirometer back to the hospital.          Please do not bring home medications with you on the day of surgery unless otherwise directed by your nurse. If you are instructed to bring home medications, please give them to your nurse as they will be administered by the nursing staff. If you have any questions, please call [address] [place] [address] [phone]. A copy of this note was provided to the patient for reference.

## 2023-11-01 LAB
COMMENT:: ABNORMAL
COTININE UR QL SCN: POSITIVE NG/ML

## 2023-11-10 DIAGNOSIS — M21.962 ACQUIRED DEFORMITY OF LEFT KNEE: ICD-10-CM

## 2023-11-10 DIAGNOSIS — M17.12 PRIMARY OSTEOARTHRITIS OF LEFT KNEE: ICD-10-CM

## 2023-11-13 DIAGNOSIS — G89.18 POSTOPERATIVE PAIN: Primary | ICD-10-CM

## 2023-11-13 RX ORDER — SENNOSIDES A AND B 8.6 MG/1
1 TABLET, FILM COATED ORAL 2 TIMES DAILY
Qty: 30 TABLET | Refills: 2 | Status: SHIPPED | OUTPATIENT
Start: 2023-11-13

## 2023-11-13 RX ORDER — CYCLOBENZAPRINE HCL 5 MG
5 TABLET ORAL 3 TIMES DAILY PRN
Qty: 30 TABLET | Refills: 0 | Status: SHIPPED | OUTPATIENT
Start: 2023-11-13 | End: 2023-11-23

## 2023-11-13 RX ORDER — ASPIRIN 81 MG/1
81 TABLET ORAL 2 TIMES DAILY
Qty: 60 TABLET | Refills: 0 | Status: SHIPPED | OUTPATIENT
Start: 2023-11-13

## 2023-11-13 RX ORDER — ZOLPIDEM TARTRATE 5 MG/1
5 TABLET ORAL NIGHTLY PRN
Qty: 60 TABLET | Refills: 0 | Status: SHIPPED | OUTPATIENT
Start: 2023-11-13 | End: 2024-01-12

## 2023-11-13 RX ORDER — OMEPRAZOLE 40 MG/1
40 CAPSULE, DELAYED RELEASE ORAL DAILY
Qty: 30 CAPSULE | Refills: 0 | Status: SHIPPED | OUTPATIENT
Start: 2023-11-13

## 2023-11-13 RX ORDER — DICLOFENAC SODIUM 75 MG/1
75 TABLET, DELAYED RELEASE ORAL 2 TIMES DAILY
Qty: 60 TABLET | Refills: 3 | Status: SHIPPED | OUTPATIENT
Start: 2023-11-13

## 2023-11-13 RX ORDER — OXYCODONE HYDROCHLORIDE 5 MG/1
10 TABLET ORAL EVERY 4 HOURS PRN
Qty: 60 TABLET | Refills: 0 | Status: SHIPPED | OUTPATIENT
Start: 2023-11-13 | End: 2023-11-20

## 2023-11-13 RX ORDER — ACETAMINOPHEN 500 MG
1000 TABLET ORAL EVERY 6 HOURS PRN
Qty: 180 TABLET | Refills: 2 | Status: SHIPPED | OUTPATIENT
Start: 2023-11-13

## 2023-11-13 RX ORDER — ONDANSETRON 4 MG/1
4 TABLET, FILM COATED ORAL 3 TIMES DAILY PRN
Qty: 30 TABLET | Refills: 1 | Status: SHIPPED | OUTPATIENT
Start: 2023-11-13

## 2023-11-13 RX ORDER — GABAPENTIN 300 MG/1
300 CAPSULE ORAL 2 TIMES DAILY
Qty: 30 CAPSULE | Refills: 0 | Status: SHIPPED | OUTPATIENT
Start: 2023-11-13 | End: 2023-11-28

## 2023-11-17 ENCOUNTER — OFFICE VISIT (OUTPATIENT)
Dept: ORTHOPEDIC SURGERY | Age: 58
End: 2023-11-17

## 2023-11-17 DIAGNOSIS — M17.12 PRIMARY OSTEOARTHRITIS OF LEFT KNEE: Primary | ICD-10-CM

## 2023-11-22 ENCOUNTER — ANESTHESIA EVENT (OUTPATIENT)
Dept: SURGERY | Age: 58
End: 2023-11-22
Payer: COMMERCIAL

## 2023-11-22 ENCOUNTER — HOSPITAL ENCOUNTER (OUTPATIENT)
Age: 58
Discharge: HOME HEALTH | End: 2023-11-22
Attending: ORTHOPAEDIC SURGERY | Admitting: ORTHOPAEDIC SURGERY
Payer: COMMERCIAL

## 2023-11-22 ENCOUNTER — HOME HEALTH ADMISSION (OUTPATIENT)
Dept: HOME HEALTH SERVICES | Facility: HOME HEALTH | Age: 58
End: 2023-11-22
Payer: COMMERCIAL

## 2023-11-22 ENCOUNTER — ANESTHESIA (OUTPATIENT)
Dept: SURGERY | Age: 58
End: 2023-11-22
Payer: COMMERCIAL

## 2023-11-22 VITALS
HEART RATE: 84 BPM | HEIGHT: 66 IN | BODY MASS INDEX: 32.92 KG/M2 | OXYGEN SATURATION: 96 % | WEIGHT: 204.8 LBS | SYSTOLIC BLOOD PRESSURE: 113 MMHG | RESPIRATION RATE: 18 BRPM | TEMPERATURE: 98.3 F | DIASTOLIC BLOOD PRESSURE: 79 MMHG

## 2023-11-22 DIAGNOSIS — G89.18 POSTOPERATIVE PAIN: Primary | ICD-10-CM

## 2023-11-22 PROBLEM — M17.12 OSTEOARTHRITIS OF LEFT KNEE, UNSPECIFIED OSTEOARTHRITIS TYPE: Status: ACTIVE | Noted: 2023-11-22

## 2023-11-22 PROCEDURE — 97530 THERAPEUTIC ACTIVITIES: CPT

## 2023-11-22 PROCEDURE — 2500000003 HC RX 250 WO HCPCS: Performed by: ANESTHESIOLOGY

## 2023-11-22 PROCEDURE — 64447 NJX AA&/STRD FEMORAL NRV IMG: CPT | Performed by: ANESTHESIOLOGY

## 2023-11-22 PROCEDURE — 6360000002 HC RX W HCPCS: Performed by: NURSE ANESTHETIST, CERTIFIED REGISTERED

## 2023-11-22 PROCEDURE — 6360000002 HC RX W HCPCS: Performed by: ANESTHESIOLOGY

## 2023-11-22 PROCEDURE — 97161 PT EVAL LOW COMPLEX 20 MIN: CPT

## 2023-11-22 PROCEDURE — 6360000002 HC RX W HCPCS: Performed by: ORTHOPAEDIC SURGERY

## 2023-11-22 PROCEDURE — 2580000003 HC RX 258: Performed by: NURSE ANESTHETIST, CERTIFIED REGISTERED

## 2023-11-22 PROCEDURE — 2580000003 HC RX 258: Performed by: ANESTHESIOLOGY

## 2023-11-22 PROCEDURE — 2500000003 HC RX 250 WO HCPCS: Performed by: NURSE ANESTHETIST, CERTIFIED REGISTERED

## 2023-11-22 PROCEDURE — 97535 SELF CARE MNGMENT TRAINING: CPT

## 2023-11-22 PROCEDURE — 97165 OT EVAL LOW COMPLEX 30 MIN: CPT

## 2023-11-22 PROCEDURE — 2580000003 HC RX 258: Performed by: ORTHOPAEDIC SURGERY

## 2023-11-22 PROCEDURE — 6360000002 HC RX W HCPCS: Performed by: NURSE PRACTITIONER

## 2023-11-22 RX ORDER — KETOROLAC TROMETHAMINE 15 MG/ML
15 INJECTION, SOLUTION INTRAMUSCULAR; INTRAVENOUS EVERY 8 HOURS
Status: DISCONTINUED | OUTPATIENT
Start: 2023-11-22 | End: 2023-11-22 | Stop reason: HOSPADM

## 2023-11-22 RX ORDER — FENTANYL CITRATE 50 UG/ML
100 INJECTION, SOLUTION INTRAMUSCULAR; INTRAVENOUS
Status: COMPLETED | OUTPATIENT
Start: 2023-11-22 | End: 2023-11-22

## 2023-11-22 RX ORDER — TRANEXAMIC ACID 650 MG/1
1300 TABLET ORAL
Status: DISCONTINUED | OUTPATIENT
Start: 2023-11-22 | End: 2023-11-22 | Stop reason: HOSPADM

## 2023-11-22 RX ORDER — SODIUM CHLORIDE 0.9 % (FLUSH) 0.9 %
5-40 SYRINGE (ML) INJECTION EVERY 12 HOURS SCHEDULED
Status: DISCONTINUED | OUTPATIENT
Start: 2023-11-22 | End: 2023-11-22 | Stop reason: HOSPADM

## 2023-11-22 RX ORDER — VANCOMYCIN HYDROCHLORIDE 1 G/20ML
INJECTION, POWDER, LYOPHILIZED, FOR SOLUTION INTRAVENOUS PRN
Status: DISCONTINUED | OUTPATIENT
Start: 2023-11-22 | End: 2023-11-22 | Stop reason: HOSPADM

## 2023-11-22 RX ORDER — SODIUM CHLORIDE 9 MG/ML
INJECTION, SOLUTION INTRAVENOUS PRN
Status: DISCONTINUED | OUTPATIENT
Start: 2023-11-22 | End: 2023-11-22 | Stop reason: HOSPADM

## 2023-11-22 RX ORDER — ONDANSETRON 2 MG/ML
4 INJECTION INTRAMUSCULAR; INTRAVENOUS
Status: DISCONTINUED | OUTPATIENT
Start: 2023-11-22 | End: 2023-11-22 | Stop reason: HOSPADM

## 2023-11-22 RX ORDER — ALBUTEROL SULFATE 2.5 MG/3ML
2.5 SOLUTION RESPIRATORY (INHALATION) EVERY 6 HOURS PRN
Status: DISCONTINUED | OUTPATIENT
Start: 2023-11-22 | End: 2023-11-22 | Stop reason: HOSPADM

## 2023-11-22 RX ORDER — TRANEXAMIC ACID 100 MG/ML
INJECTION, SOLUTION INTRAVENOUS PRN
Status: DISCONTINUED | OUTPATIENT
Start: 2023-11-22 | End: 2023-11-22 | Stop reason: SDUPTHER

## 2023-11-22 RX ORDER — GABAPENTIN 300 MG/1
300 CAPSULE ORAL 2 TIMES DAILY
Status: DISCONTINUED | OUTPATIENT
Start: 2023-11-22 | End: 2023-11-22 | Stop reason: HOSPADM

## 2023-11-22 RX ORDER — ROPIVACAINE HYDROCHLORIDE 2 MG/ML
INJECTION, SOLUTION EPIDURAL; INFILTRATION; PERINEURAL
Status: DISCONTINUED | OUTPATIENT
Start: 2023-11-22 | End: 2023-11-22 | Stop reason: SDUPTHER

## 2023-11-22 RX ORDER — DEXAMETHASONE SODIUM PHOSPHATE 10 MG/ML
INJECTION INTRAMUSCULAR; INTRAVENOUS PRN
Status: DISCONTINUED | OUTPATIENT
Start: 2023-11-22 | End: 2023-11-22 | Stop reason: SDUPTHER

## 2023-11-22 RX ORDER — SODIUM CHLORIDE 9 MG/ML
INJECTION, SOLUTION INTRAVENOUS PRN
Status: DISCONTINUED | OUTPATIENT
Start: 2023-11-22 | End: 2023-11-22 | Stop reason: SDUPTHER

## 2023-11-22 RX ORDER — DIPHENHYDRAMINE HCL 25 MG
25 CAPSULE ORAL EVERY 6 HOURS PRN
Status: DISCONTINUED | OUTPATIENT
Start: 2023-11-22 | End: 2023-11-22 | Stop reason: HOSPADM

## 2023-11-22 RX ORDER — ONDANSETRON 4 MG/1
4 TABLET, ORALLY DISINTEGRATING ORAL EVERY 8 HOURS PRN
Status: DISCONTINUED | OUTPATIENT
Start: 2023-11-22 | End: 2023-11-22 | Stop reason: HOSPADM

## 2023-11-22 RX ORDER — LIDOCAINE HYDROCHLORIDE 10 MG/ML
1 INJECTION, SOLUTION INFILTRATION; PERINEURAL
Status: COMPLETED | OUTPATIENT
Start: 2023-11-22 | End: 2023-11-22

## 2023-11-22 RX ORDER — OXYCODONE HYDROCHLORIDE 5 MG/1
10 TABLET ORAL EVERY 4 HOURS PRN
Qty: 60 TABLET | Refills: 0 | Status: SHIPPED | OUTPATIENT
Start: 2023-11-22 | End: 2023-11-29

## 2023-11-22 RX ORDER — GABAPENTIN 300 MG/1
300 CAPSULE ORAL 2 TIMES DAILY
Qty: 30 CAPSULE | Refills: 0 | Status: SHIPPED | OUTPATIENT
Start: 2023-11-22 | End: 2023-12-07

## 2023-11-22 RX ORDER — ASPIRIN 81 MG/1
81 TABLET ORAL 2 TIMES DAILY
Qty: 60 TABLET | Refills: 0 | Status: SHIPPED | OUTPATIENT
Start: 2023-11-22

## 2023-11-22 RX ORDER — FENTANYL CITRATE 50 UG/ML
25 INJECTION, SOLUTION INTRAMUSCULAR; INTRAVENOUS
Status: COMPLETED | OUTPATIENT
Start: 2023-11-22 | End: 2023-11-22

## 2023-11-22 RX ORDER — ROPIVACAINE HYDROCHLORIDE 2 MG/ML
INJECTION, SOLUTION EPIDURAL; INFILTRATION; PERINEURAL PRN
Status: DISCONTINUED | OUTPATIENT
Start: 2023-11-22 | End: 2023-11-22 | Stop reason: HOSPADM

## 2023-11-22 RX ORDER — MELOXICAM 7.5 MG/1
7.5 TABLET ORAL 2 TIMES DAILY
Status: CANCELLED | OUTPATIENT
Start: 2023-11-22

## 2023-11-22 RX ORDER — ZOLPIDEM TARTRATE 5 MG/1
5 TABLET ORAL NIGHTLY PRN
Qty: 60 TABLET | Refills: 0 | Status: SHIPPED | OUTPATIENT
Start: 2023-11-22 | End: 2024-01-21

## 2023-11-22 RX ORDER — EPHEDRINE SULFATE/0.9% NACL/PF 50 MG/5 ML
SYRINGE (ML) INTRAVENOUS PRN
Status: DISCONTINUED | OUTPATIENT
Start: 2023-11-22 | End: 2023-11-22 | Stop reason: SDUPTHER

## 2023-11-22 RX ORDER — ONDANSETRON 2 MG/ML
4 INJECTION INTRAMUSCULAR; INTRAVENOUS EVERY 6 HOURS PRN
Status: DISCONTINUED | OUTPATIENT
Start: 2023-11-22 | End: 2023-11-22 | Stop reason: HOSPADM

## 2023-11-22 RX ORDER — ONDANSETRON 2 MG/ML
INJECTION INTRAMUSCULAR; INTRAVENOUS PRN
Status: DISCONTINUED | OUTPATIENT
Start: 2023-11-22 | End: 2023-11-22 | Stop reason: SDUPTHER

## 2023-11-22 RX ORDER — SODIUM CHLORIDE 0.9 % (FLUSH) 0.9 %
5-40 SYRINGE (ML) INJECTION PRN
Status: DISCONTINUED | OUTPATIENT
Start: 2023-11-22 | End: 2023-11-22 | Stop reason: HOSPADM

## 2023-11-22 RX ORDER — SODIUM CHLORIDE 0.9 % (FLUSH) 0.9 %
5-40 SYRINGE (ML) INJECTION PRN
Status: DISCONTINUED | OUTPATIENT
Start: 2023-11-22 | End: 2023-11-22 | Stop reason: SDUPTHER

## 2023-11-22 RX ORDER — ONDANSETRON 4 MG/1
8 TABLET, ORALLY DISINTEGRATING ORAL EVERY 8 HOURS PRN
Status: DISCONTINUED | OUTPATIENT
Start: 2023-11-22 | End: 2023-11-22

## 2023-11-22 RX ORDER — OXYCODONE HYDROCHLORIDE 5 MG/1
5 TABLET ORAL PRN
Status: DISCONTINUED | OUTPATIENT
Start: 2023-11-22 | End: 2023-11-22 | Stop reason: HOSPADM

## 2023-11-22 RX ORDER — HYDROMORPHONE HYDROCHLORIDE 1 MG/ML
0.25 INJECTION, SOLUTION INTRAMUSCULAR; INTRAVENOUS; SUBCUTANEOUS EVERY 5 MIN PRN
Status: DISCONTINUED | OUTPATIENT
Start: 2023-11-22 | End: 2023-11-22 | Stop reason: HOSPADM

## 2023-11-22 RX ORDER — SENNA AND DOCUSATE SODIUM 50; 8.6 MG/1; MG/1
1 TABLET, FILM COATED ORAL 2 TIMES DAILY
Status: DISCONTINUED | OUTPATIENT
Start: 2023-11-22 | End: 2023-11-22 | Stop reason: HOSPADM

## 2023-11-22 RX ORDER — PANTOPRAZOLE SODIUM 40 MG/1
40 TABLET, DELAYED RELEASE ORAL
Status: DISCONTINUED | OUTPATIENT
Start: 2023-11-23 | End: 2023-11-22 | Stop reason: HOSPADM

## 2023-11-22 RX ORDER — SENNOSIDES A AND B 8.6 MG/1
1 TABLET, FILM COATED ORAL 2 TIMES DAILY
Qty: 30 TABLET | Refills: 2 | Status: SHIPPED | OUTPATIENT
Start: 2023-11-22

## 2023-11-22 RX ORDER — KETOROLAC TROMETHAMINE 30 MG/ML
INJECTION, SOLUTION INTRAMUSCULAR; INTRAVENOUS PRN
Status: DISCONTINUED | OUTPATIENT
Start: 2023-11-22 | End: 2023-11-22 | Stop reason: HOSPADM

## 2023-11-22 RX ORDER — DEXAMETHASONE SODIUM PHOSPHATE 10 MG/ML
10 INJECTION INTRAMUSCULAR; INTRAVENOUS ONCE
Status: DISCONTINUED | OUTPATIENT
Start: 2023-11-23 | End: 2023-11-22 | Stop reason: HOSPADM

## 2023-11-22 RX ORDER — SODIUM CHLORIDE 9 MG/ML
INJECTION, SOLUTION INTRAVENOUS CONTINUOUS
Status: DISCONTINUED | OUTPATIENT
Start: 2023-11-22 | End: 2023-11-22 | Stop reason: HOSPADM

## 2023-11-22 RX ORDER — NALOXONE HYDROCHLORIDE 0.4 MG/ML
0.4 INJECTION, SOLUTION INTRAMUSCULAR; INTRAVENOUS; SUBCUTANEOUS PRN
Status: DISCONTINUED | OUTPATIENT
Start: 2023-11-22 | End: 2023-11-22 | Stop reason: HOSPADM

## 2023-11-22 RX ORDER — CYCLOBENZAPRINE HCL 5 MG
5 TABLET ORAL 3 TIMES DAILY PRN
Status: DISCONTINUED | OUTPATIENT
Start: 2023-11-22 | End: 2023-11-22 | Stop reason: HOSPADM

## 2023-11-22 RX ORDER — ACETAMINOPHEN 500 MG
1000 TABLET ORAL EVERY 6 HOURS PRN
Qty: 180 TABLET | Refills: 2 | Status: SHIPPED | OUTPATIENT
Start: 2023-11-22

## 2023-11-22 RX ORDER — DICLOFENAC SODIUM 75 MG/1
75 TABLET, DELAYED RELEASE ORAL 2 TIMES DAILY
Status: DISCONTINUED | OUTPATIENT
Start: 2023-11-22 | End: 2023-11-22

## 2023-11-22 RX ORDER — HYDROMORPHONE HYDROCHLORIDE 1 MG/ML
1 INJECTION, SOLUTION INTRAMUSCULAR; INTRAVENOUS; SUBCUTANEOUS
Status: DISCONTINUED | OUTPATIENT
Start: 2023-11-22 | End: 2023-11-22 | Stop reason: HOSPADM

## 2023-11-22 RX ORDER — OXYCODONE HYDROCHLORIDE 5 MG/1
10 TABLET ORAL PRN
Status: DISCONTINUED | OUTPATIENT
Start: 2023-11-22 | End: 2023-11-22 | Stop reason: HOSPADM

## 2023-11-22 RX ORDER — HYDROMORPHONE HYDROCHLORIDE 1 MG/ML
0.5 INJECTION, SOLUTION INTRAMUSCULAR; INTRAVENOUS; SUBCUTANEOUS EVERY 5 MIN PRN
Status: DISCONTINUED | OUTPATIENT
Start: 2023-11-22 | End: 2023-11-22 | Stop reason: HOSPADM

## 2023-11-22 RX ORDER — ONDANSETRON 4 MG/1
4 TABLET, FILM COATED ORAL 3 TIMES DAILY PRN
Qty: 30 TABLET | Refills: 1 | Status: SHIPPED | OUTPATIENT
Start: 2023-11-22

## 2023-11-22 RX ORDER — ASPIRIN 81 MG/1
81 TABLET ORAL 2 TIMES DAILY
Status: DISCONTINUED | OUTPATIENT
Start: 2023-11-22 | End: 2023-11-22 | Stop reason: HOSPADM

## 2023-11-22 RX ORDER — OXYCODONE HCL 10 MG/1
10 TABLET, FILM COATED, EXTENDED RELEASE ORAL EVERY 12 HOURS SCHEDULED
Status: DISCONTINUED | OUTPATIENT
Start: 2023-11-22 | End: 2023-11-22 | Stop reason: HOSPADM

## 2023-11-22 RX ORDER — SODIUM CHLORIDE 0.9 % (FLUSH) 0.9 %
5-40 SYRINGE (ML) INJECTION EVERY 12 HOURS SCHEDULED
Status: DISCONTINUED | OUTPATIENT
Start: 2023-11-22 | End: 2023-11-22 | Stop reason: SDUPTHER

## 2023-11-22 RX ORDER — SODIUM CHLORIDE, SODIUM LACTATE, POTASSIUM CHLORIDE, CALCIUM CHLORIDE 600; 310; 30; 20 MG/100ML; MG/100ML; MG/100ML; MG/100ML
INJECTION, SOLUTION INTRAVENOUS CONTINUOUS
Status: DISCONTINUED | OUTPATIENT
Start: 2023-11-22 | End: 2023-11-22 | Stop reason: HOSPADM

## 2023-11-22 RX ORDER — DICLOFENAC SODIUM 75 MG/1
75 TABLET, DELAYED RELEASE ORAL 2 TIMES DAILY
Qty: 60 TABLET | Refills: 3 | Status: SHIPPED | OUTPATIENT
Start: 2023-11-22

## 2023-11-22 RX ORDER — DIPHENHYDRAMINE HYDROCHLORIDE 50 MG/ML
25 INJECTION INTRAMUSCULAR; INTRAVENOUS EVERY 6 HOURS PRN
Status: DISCONTINUED | OUTPATIENT
Start: 2023-11-22 | End: 2023-11-22 | Stop reason: HOSPADM

## 2023-11-22 RX ORDER — PROPOFOL 10 MG/ML
INJECTION, EMULSION INTRAVENOUS CONTINUOUS PRN
Status: DISCONTINUED | OUTPATIENT
Start: 2023-11-22 | End: 2023-11-22 | Stop reason: SDUPTHER

## 2023-11-22 RX ORDER — CYCLOBENZAPRINE HCL 5 MG
5 TABLET ORAL 3 TIMES DAILY PRN
Qty: 30 TABLET | Refills: 0 | Status: SHIPPED | OUTPATIENT
Start: 2023-11-22 | End: 2023-12-02

## 2023-11-22 RX ORDER — ZOLPIDEM TARTRATE 5 MG/1
5 TABLET ORAL NIGHTLY PRN
Status: DISCONTINUED | OUTPATIENT
Start: 2023-11-22 | End: 2023-11-22 | Stop reason: HOSPADM

## 2023-11-22 RX ORDER — ACETAMINOPHEN 500 MG
1000 TABLET ORAL EVERY 6 HOURS
Status: DISCONTINUED | OUTPATIENT
Start: 2023-11-22 | End: 2023-11-22 | Stop reason: HOSPADM

## 2023-11-22 RX ORDER — OMEPRAZOLE 40 MG/1
40 CAPSULE, DELAYED RELEASE ORAL DAILY
Qty: 30 CAPSULE | Refills: 0 | Status: SHIPPED | OUTPATIENT
Start: 2023-11-22

## 2023-11-22 RX ORDER — DIPHENHYDRAMINE HYDROCHLORIDE 50 MG/ML
6.25 INJECTION INTRAMUSCULAR; INTRAVENOUS
Status: DISCONTINUED | OUTPATIENT
Start: 2023-11-22 | End: 2023-11-22 | Stop reason: HOSPADM

## 2023-11-22 RX ORDER — OXYCODONE HYDROCHLORIDE 5 MG/1
10 TABLET ORAL EVERY 4 HOURS PRN
Status: DISCONTINUED | OUTPATIENT
Start: 2023-11-22 | End: 2023-11-22 | Stop reason: HOSPADM

## 2023-11-22 RX ORDER — MIDAZOLAM HYDROCHLORIDE 1 MG/ML
INJECTION INTRAMUSCULAR; INTRAVENOUS PRN
Status: DISCONTINUED | OUTPATIENT
Start: 2023-11-22 | End: 2023-11-22 | Stop reason: SDUPTHER

## 2023-11-22 RX ORDER — IBUPROFEN 600 MG/1
600 TABLET ORAL 3 TIMES DAILY
Status: DISCONTINUED | OUTPATIENT
Start: 2023-11-25 | End: 2023-11-22 | Stop reason: HOSPADM

## 2023-11-22 RX ORDER — TOBRAMYCIN 1.2 G/30ML
INJECTION, POWDER, LYOPHILIZED, FOR SOLUTION INTRAVENOUS PRN
Status: DISCONTINUED | OUTPATIENT
Start: 2023-11-22 | End: 2023-11-22 | Stop reason: HOSPADM

## 2023-11-22 RX ORDER — OXYCODONE HYDROCHLORIDE 5 MG/1
5 TABLET ORAL EVERY 4 HOURS PRN
Status: DISCONTINUED | OUTPATIENT
Start: 2023-11-22 | End: 2023-11-22 | Stop reason: HOSPADM

## 2023-11-22 RX ORDER — MIDAZOLAM HYDROCHLORIDE 2 MG/2ML
2 INJECTION, SOLUTION INTRAMUSCULAR; INTRAVENOUS
Status: COMPLETED | OUTPATIENT
Start: 2023-11-22 | End: 2023-11-22

## 2023-11-22 RX ADMIN — TRANEXAMIC ACID 1000 MG: 100 INJECTION, SOLUTION INTRAVENOUS at 13:34

## 2023-11-22 RX ADMIN — PROPOFOL 50 MCG/KG/MIN: 10 INJECTION, EMULSION INTRAVENOUS at 12:09

## 2023-11-22 RX ADMIN — MEPIVACAINE HYDROCHLORIDE 60 MG: 20 INJECTION, SOLUTION EPIDURAL; INFILTRATION at 11:49

## 2023-11-22 RX ADMIN — PHENYLEPHRINE HYDROCHLORIDE 10 MCG/MIN: 10 INJECTION INTRAVENOUS at 12:25

## 2023-11-22 RX ADMIN — ONDANSETRON 4 MG: 2 INJECTION INTRAMUSCULAR; INTRAVENOUS at 12:28

## 2023-11-22 RX ADMIN — TRANEXAMIC ACID 1000 MG: 100 INJECTION, SOLUTION INTRAVENOUS at 11:51

## 2023-11-22 RX ADMIN — ROPIVACAINE HYDROCHLORIDE 20 ML: 2 INJECTION, SOLUTION EPIDURAL; INFILTRATION at 10:51

## 2023-11-22 RX ADMIN — MIDAZOLAM 2 MG: 1 INJECTION INTRAMUSCULAR; INTRAVENOUS at 10:50

## 2023-11-22 RX ADMIN — Medication 2 G: at 11:42

## 2023-11-22 RX ADMIN — LIDOCAINE HYDROCHLORIDE 1 ML: 10 INJECTION, SOLUTION INFILTRATION; PERINEURAL at 10:27

## 2023-11-22 RX ADMIN — FENTANYL CITRATE 100 MCG: 50 INJECTION INTRAMUSCULAR; INTRAVENOUS at 10:51

## 2023-11-22 RX ADMIN — PHENYLEPHRINE HYDROCHLORIDE 100 MCG: 0.1 INJECTION, SOLUTION INTRAVENOUS at 12:15

## 2023-11-22 RX ADMIN — VANCOMYCIN HYDROCHLORIDE 1500 MG: 10 INJECTION, POWDER, LYOPHILIZED, FOR SOLUTION INTRAVENOUS at 10:27

## 2023-11-22 RX ADMIN — Medication 10 MG: at 12:31

## 2023-11-22 RX ADMIN — SODIUM CHLORIDE, POTASSIUM CHLORIDE, SODIUM LACTATE AND CALCIUM CHLORIDE: 600; 310; 30; 20 INJECTION, SOLUTION INTRAVENOUS at 10:27

## 2023-11-22 RX ADMIN — MIDAZOLAM 2 MG: 1 INJECTION INTRAMUSCULAR; INTRAVENOUS at 11:48

## 2023-11-22 RX ADMIN — DEXAMETHASONE SODIUM PHOSPHATE 10 MG: 10 INJECTION INTRAMUSCULAR; INTRAVENOUS at 12:28

## 2023-11-22 RX ADMIN — PHENYLEPHRINE HYDROCHLORIDE 100 MCG: 0.1 INJECTION, SOLUTION INTRAVENOUS at 12:09

## 2023-11-22 RX ADMIN — Medication 10 MG: at 12:21

## 2023-11-22 ASSESSMENT — KOOS JR
HOW SEVERE IS YOUR KNEE STIFFNESS AFTER FIRST WAKING IN MORNING: 2
STRAIGHTENING KNEE FULLY: 2
BENDING TO THE FLOOR TO PICK UP OBJECT: 2
STANDING UPRIGHT: 2
GOING UP OR DOWN STAIRS: 2
TWISING OR PIVOTING ON KNEE: 2
KOOS JR TOTAL INTERVAL SCORE: 52.465
RISING FROM SITTING: 2

## 2023-11-22 ASSESSMENT — LIFESTYLE VARIABLES: SMOKING_STATUS: 1

## 2023-11-22 ASSESSMENT — PAIN - FUNCTIONAL ASSESSMENT: PAIN_FUNCTIONAL_ASSESSMENT: 0-10

## 2023-11-22 ASSESSMENT — PAIN DESCRIPTION - DESCRIPTORS: DESCRIPTORS: ACHING

## 2023-11-22 NOTE — ANESTHESIA POSTPROCEDURE EVALUATION
Department of Anesthesiology  Postprocedure Note    Patient: Fatimah Enriquez  MRN: [id number]  YOB: 1965  Date of evaluation: 11/22/2023      Procedure Summary     Date: 11/22/23 [place] [facility] [place] [facility] [place]    Anesthesia Start: 1141 Anesthesia Stop: 5381    Procedure: LEFT KNEE TOTAL ARTHROPLASTY ROBOTIC, [place]/MARLON, Prevena, Vanc, Cellerate, Tobra/ SDD (Left: Knee) Diagnosis:       Primary osteoarthritis of left knee      Acquired deformity of left knee      (Primary osteoarthritis of left knee [M17.12])      (Acquired deformity of left knee [M21.962])    Surgeons: John Montoya MD Responsible Provider: Anthony Miller MD    Anesthesia Type: spinal ASA Status: 3          Anesthesia Type: No value filed.     Kathleen Phase I: Kathleen Score: 9    Kathleen Phase II:        Anesthesia Post Evaluation    Patient location during evaluation: PACU  Patient participation: complete - patient participated  Level of consciousness: awake  Airway patency: patent  Nausea & Vomiting: no nausea  Complications: no  Cardiovascular status: blood pressure returned to baseline and hemodynamically stable  Respiratory status: acceptable  Hydration status: stable  Multimodal analgesia pain management approach  Pain management: adequate

## 2023-11-22 NOTE — OP NOTE
polyethylene trial in place the knee had acceptable varus and valgus stability throughout arc of motion, was able to fully extend, was not too tight in flexion, and had acceptable stability with anterior  Drawer testing. No additional surgical releases were required. The patella was then everted. The patella was examined and found to be in poor condition and was resurfaced. Bone was resected to accomodate a size 38mm patella button. A trial reduction revealed appropriate tracking through the patellofemoral groove with no lateral retinacular release required. Again patellar tracking was assessed and it was felt that the patella was tracking appropriately within the femoral trochlear groove. At this point the patella was irrigated of any debride and the final patellar component was inserted which was a tritanium-backed cementless component. At this point the trial polyethylene component and trial femoral component were removed. We again assessed our tibial tray and verified that we were satisfied with its position. We did not have to adjust its position. The proximal tibia was punched and drilled per protocol for the system. We irrigated and debrided all bony surfaces of residual tissue and bone. We then inserted the tibial and femoral components and noted them to be well seated. We then inserted our final polyethylene component which was a 11mm thick. Allan Richey's knee was placed through a range of motion and noted to be stable as mentioned above with the trial components. In additional we checked patellar tracking one last time which was felt to be appropriate. A lavage of a combination of dilute betadine and Irrisept was allowed to soak in the wound after implanting of the prosthesis. During this time we removed the previously placed femoral and tibial sensors and array pins and finished injection our periarticular cocktail. The wound was irrigated with normal saline again before closure.     Prior

## 2023-11-22 NOTE — ANESTHESIA PRE PROCEDURE
Department of Anesthesiology  Preprocedure Note       Name:  Devon Robles   Age:  60 y.o.  :  1965                                          MRN:  [id number]         Date:  2023      Surgeon: Jolie Fleming):  Adelaide Guerrero MD    Procedure: Procedure(s):  LEFT KNEE TOTAL ARTHROPLASTY ROBOTIC, Martin/MARLON, Prevena, Vanc, Cellerate, Tobra/ SDD    Medications prior to admission:   Prior to Admission medications    Medication Sig Start Date End Date Taking? Authorizing Provider   acetaminophen (TYLENOL) 500 MG tablet Take 2 tablets by mouth every 6 hours as needed for Pain 23   Adelaide Guerrero MD   zolpidem (AMBIEN) 5 MG tablet Take 1 tablet by mouth nightly as needed for Sleep for up to 60 days. Max Daily Amount: 10 mg 23  Adelaide Guerrero MD   aspirin (ASPIRIN 81) 81 MG EC tablet Take 1 tablet by mouth in the morning and at bedtime Take one tablet morning and evening 23   Adelaide Guerrero MD   cyclobenzaprine (FLEXERIL) 5 MG tablet Take 1 tablet by mouth 3 times daily as needed for Muscle spasms 23  Adelaide Guerrero MD   gabapentin (NEURONTIN) 300 MG capsule Take 1 capsule by mouth 2 times daily for 15 days.  23  Adelaide Guerrero MD   diclofenac (VOLTAREN) 75 MG EC tablet Take 1 tablet by mouth 2 times daily 23   Adelaide Guerrero MD   omeprazole (PRILOSEC) 40 MG delayed release capsule Take 1 capsule by mouth daily 23   Adelaide Guerrero MD   ondansetron (ZOFRAN) 4 MG tablet Take 1 tablet by mouth 3 times daily as needed for Nausea or Vomiting 23   Adelaide Guerrero MD   senna (SENOKOT) 8.6 MG tablet Take 1 tablet by mouth 2 times daily 23   Adelaide Guerrero MD   Magnesium Salicylate Tetrahyd (DOANS EXTRA STRENGTH PO) Take by mouth    ProviderCarlotta MD   diclofenac (VOLTAREN) 75 MG EC tablet Take 1 tablet by mouth 2 times daily 23   Adelaide Guerrero MD       Current medications:    No current facility-administered

## 2023-11-22 NOTE — ANESTHESIA PROCEDURE NOTES
Peripheral Block    Patient location during procedure: pre-op  Reason for block: post-op pain management and at surgeon's request  Start time: 11/22/2023 10:51 AM  End time: 11/22/2023 10:53 AM  Staffing  Performed: anesthesiologist   Anesthesiologist: Meme Lynch MD  Performed by: Meme Lynch MD  Authorized by: Meme Lynch MD    Preanesthetic Checklist  Completed: patient identified, IV checked, site marked, risks and benefits discussed, surgical/procedural consents, equipment checked, pre-op evaluation, timeout performed, anesthesia consent given, oxygen available and monitors applied/VS acknowledged  Peripheral Block   Patient position: supine  Prep: ChloraPrep  Provider prep: mask and sterile gloves  Patient monitoring: cardiac monitor, continuous pulse ox, continuous capnometry, frequent blood pressure checks, IV access, oxygen and responsive to questions  Block type: Femoral  Adductor canal  Laterality: left  Injection technique: single-shot  Guidance: ultrasound guided  Local infiltration: ropivacaine  Infiltration strength: 0.2 %  Local infiltration: ropivacaine  Dose: 3 mL    Needle   Needle type: insulated echogenic nerve stimulator needle   Needle gauge: 20 G  Needle localization: ultrasound guidance  Needle insertion depth: 7 cm  Test dose: negative  Needle length: 10 cm  Assessment   Injection assessment: negative aspiration for heme, no paresthesia on injection, local visualized surrounding nerve on ultrasound and no intravascular symptoms  Paresthesia pain: none  Slow fractionated injection: yes  Hemodynamics: stable  Real-time US image taken/store: yes  Outcomes: uncomplicated    Additional Notes  A peripheral nerve block (PNB) was performed at the request of the operating surgeon to assist with postoperative pain control. The risks of PNB (including possible nerve and muscle injury), benefits, and alternative therapies were discussed with the patient.  The patient then consented

## 2023-11-22 NOTE — ANESTHESIA PROCEDURE NOTES
Spinal Block    Patient location during procedure: OR  End time: 11/22/2023 11:53 AM  Reason for block: post-op pain management, primary anesthetic and at surgeon's request  Staffing  Performed: anesthesiologist   Anesthesiologist: Anthony Miller MD  Performed by: Anthony Miller MD  Authorized by:  Anthony Miller MD    Spinal Block  Patient position: sitting  Prep: ChloraPrep  Patient monitoring: cardiac monitor, continuous pulse ox, frequent blood pressure checks and oxygen  Approach: midline  Location: L3/L4  Provider prep: mask and sterile gloves  Local infiltration: lidocaine  Needle  Needle type: Quincke   Needle gauge: 22 G  Needle length: 3.5 in  Catheter at skin depth: 7 cm  Assessment  Sensory level: T10  Swirl obtained: Yes  CSF: clear  Attempts: 2  Hemodynamics: stable  Preanesthetic Checklist  Completed: patient identified, IV checked, site marked, risks and benefits discussed, surgical/procedural consents, equipment checked, pre-op evaluation, timeout performed, anesthesia consent given, oxygen available, monitors applied/VS acknowledged and blood product R/B/A discussed and consented

## 2023-11-22 NOTE — DISCHARGE INSTRUCTIONS
[address] [place] [address]      Patient Discharge Instructions    Aleena Lopez / [id number] : 1965    Admitted 2023 Discharged: 2023     IF YOU HAVE ANY PROBLEMS ONCE YOU ARE AT HOME CALL THE FOLLOWING NUMBERS:   Main office number: [phone]      Medications    The medications you are to continue on are listed on the medication reconciliation sheet. Narcotic pain medications as well as supplemental iron can cause constipation. If this occurs try stopping the narcotic pain medication and/or the iron. It is important that you take the medication exactly as they are prescribed. Medications which increase your risk of blood clots are listed to stop for 5 weeks after surgery as well as medications or supplements which increase your risk of bleeding complications. Keep your medication in the bottles provided by the pharmacist and keep a list of the medication names, dosages, and times to be taken in your wallet. Do not take other medications without consulting your doctor. Important Information    Do NOT smoke as this will greatly increase your risk of infection! Resume your prehospital diet. If you have excessive nausea or vomitting call your doctor's office     Leg swelling and warmth is normal for 6 months after surgery. If you experience swelling in your leg elevate you leg while laying down with your toes above your heart. If you have sudden onset severe swelling with leg pain call our office. Use Vinny Hose stockings until we see you in the office for your follow up appointment. The stitches deep inside take approximately 6 months to dissolve. There will be sharp shooting, stinging and burning pain. This is normal and will resolve between 3-6 months after surgery. Difficulty sleeping is normal following total Knee and Hip replacement. You may try melatonin, an over-the-counter sleep aid or benadryl to help with sleep.  Most patients will resume sleeping through the night 8

## 2023-11-22 NOTE — CARE COORDINATION
Patient is a 60y.o. year old male admitted for Left TKA . Patient plans to return home on discharge. Order received to arrange home health. Patient without preference towards agency. Referral sent to [facility]. Patient requesting we arrange a walker. Pt without preference towards provider. Referral sent to [address] delivered to the hospital room prior to discharge. Will follow until discharge. 11/22/23 5244   Service Assessment   Patient Orientation Alert and Oriented   Cognition Alert   History Provided By Patient   Services At/After Discharge   Transition of Care Consult (CM Consult) [facility] Discharge Home Health;PT   Mode of Transport at Discharge Self   Confirm Follow Up Transport Self   Condition of Participation: Discharge Planning   The Plan for Transition of Care is related to the following treatment goals: improve mobility   The Patient and/or Patient Representative was provided with a Choice of Provider? Patient   The Patient and/Or Patient Representative agree with the Discharge Plan? Yes   Freedom of Choice list was provided with basic dialogue that supports the patient's individualized plan of care/goals, treatment preferences, and shares the quality data associated with the providers?   Yes

## 2023-11-23 ENCOUNTER — HOME CARE VISIT (OUTPATIENT)
Dept: SCHEDULING | Facility: HOME HEALTH | Age: 58
End: 2023-11-23
Payer: COMMERCIAL

## 2023-11-23 VITALS
RESPIRATION RATE: 18 BRPM | TEMPERATURE: 99.4 F | OXYGEN SATURATION: 96 % | DIASTOLIC BLOOD PRESSURE: 62 MMHG | HEART RATE: 85 BPM | SYSTOLIC BLOOD PRESSURE: 112 MMHG

## 2023-11-23 PROCEDURE — G0151 HHCP-SERV OF PT,EA 15 MIN: HCPCS

## 2023-11-23 ASSESSMENT — ENCOUNTER SYMPTOMS
DYSPNEA ACTIVITY LEVEL: AFTER AMBULATING 10 - 20 FT
PAIN LOCATION - PAIN QUALITY: THROBBING

## 2023-11-27 ENCOUNTER — TELEPHONE (OUTPATIENT)
Dept: ORTHOPEDICS UNIT | Age: 58
End: 2023-11-27

## 2023-11-27 ENCOUNTER — HOME CARE VISIT (OUTPATIENT)
Dept: SCHEDULING | Facility: HOME HEALTH | Age: 58
End: 2023-11-27
Payer: COMMERCIAL

## 2023-11-27 ENCOUNTER — TELEPHONE (OUTPATIENT)
Age: 58
End: 2023-11-27

## 2023-11-27 VITALS
SYSTOLIC BLOOD PRESSURE: 122 MMHG | TEMPERATURE: 98.2 F | HEART RATE: 72 BPM | OXYGEN SATURATION: 98 % | DIASTOLIC BLOOD PRESSURE: 72 MMHG | RESPIRATION RATE: 19 BRPM

## 2023-11-27 PROCEDURE — G0151 HHCP-SERV OF PT,EA 15 MIN: HCPCS

## 2023-11-27 ASSESSMENT — ENCOUNTER SYMPTOMS: PAIN LOCATION - PAIN QUALITY: STRONG ACHE

## 2023-11-27 NOTE — TELEPHONE ENCOUNTER
Called to follow up after TKA with SDD on 11/22/23. No answer. VM left with contact number for ortho navigator.

## 2023-11-29 ENCOUNTER — TELEPHONE (OUTPATIENT)
Dept: ORTHOPEDIC SURGERY | Age: 58
End: 2023-11-29

## 2023-11-29 ENCOUNTER — HOME CARE VISIT (OUTPATIENT)
Dept: HOME HEALTH SERVICES | Facility: HOME HEALTH | Age: 58
End: 2023-11-29
Payer: COMMERCIAL

## 2023-11-29 VITALS
SYSTOLIC BLOOD PRESSURE: 132 MMHG | DIASTOLIC BLOOD PRESSURE: 70 MMHG | RESPIRATION RATE: 18 BRPM | TEMPERATURE: 97.1 F | HEART RATE: 106 BPM | OXYGEN SATURATION: 94 %

## 2023-11-29 DIAGNOSIS — Z96.652 STATUS POST LEFT KNEE REPLACEMENT: Primary | ICD-10-CM

## 2023-11-29 PROCEDURE — G0157 HHC PT ASSISTANT EA 15: HCPCS

## 2023-11-29 ASSESSMENT — ENCOUNTER SYMPTOMS: PAIN LOCATION - PAIN QUALITY: ACHES, JABS

## 2023-11-29 NOTE — CASE COMMUNICATION
On the afternoon of 11-29-23 patient's HR above parameters at 106 BPM, O2 sats at 94, all other vitals within normal range at beginning of visit. Removed wound vac, incision was clean, dry and intact, no signs of infection, covered w/Aquacel. Had patient ambulate 50 feet with RW, then patient sat on a metal folding chair so I could measure his ROM. Measured his knee, was 10 to 55 degrees. All of a sudden, patient stated he felt hot  and broke out in a sweat. His eyes rolled back and I grabbed him to keep him from sliding out of the chair. I yelled for his spouse and had her help me hold him upright, patient was out for about 30 seconds, then sat there for about 10 minutes until he felt steady again. He was able to stand and walk with his RW back to his bed, said he felt okay, just really tired. Told patient to take it easy for the rest of the evening, made sure  he had the phone numbers for homehealth on call nurse, and POA's on call nurse.     Melissa, PTA  [address]  [phone]

## 2023-11-30 DIAGNOSIS — Z96.652 STATUS POST LEFT KNEE REPLACEMENT: Primary | ICD-10-CM

## 2023-11-30 RX ORDER — OXYCODONE HYDROCHLORIDE 5 MG/1
10 TABLET ORAL EVERY 4 HOURS PRN
Qty: 60 TABLET | Refills: 0 | Status: SHIPPED | OUTPATIENT
Start: 2023-11-30 | End: 2023-12-07

## 2023-11-30 RX ORDER — CYCLOBENZAPRINE HCL 5 MG
5 TABLET ORAL 3 TIMES DAILY PRN
Qty: 30 TABLET | Refills: 0 | Status: SHIPPED | OUTPATIENT
Start: 2023-11-30 | End: 2023-12-10

## 2023-12-01 ENCOUNTER — HOME CARE VISIT (OUTPATIENT)
Dept: SCHEDULING | Facility: HOME HEALTH | Age: 58
End: 2023-12-01
Payer: COMMERCIAL

## 2023-12-01 VITALS
RESPIRATION RATE: 17 BRPM | DIASTOLIC BLOOD PRESSURE: 80 MMHG | HEART RATE: 98 BPM | SYSTOLIC BLOOD PRESSURE: 120 MMHG | OXYGEN SATURATION: 97 % | TEMPERATURE: 98.2 F

## 2023-12-01 PROCEDURE — G0157 HHC PT ASSISTANT EA 15: HCPCS

## 2023-12-01 ASSESSMENT — ENCOUNTER SYMPTOMS: PAIN LOCATION - PAIN QUALITY: ACHING

## 2023-12-04 ENCOUNTER — HOME CARE VISIT (OUTPATIENT)
Dept: SCHEDULING | Facility: HOME HEALTH | Age: 58
End: 2023-12-04
Payer: COMMERCIAL

## 2023-12-04 VITALS
HEART RATE: 88 BPM | DIASTOLIC BLOOD PRESSURE: 80 MMHG | RESPIRATION RATE: 17 BRPM | SYSTOLIC BLOOD PRESSURE: 112 MMHG | OXYGEN SATURATION: 98 % | TEMPERATURE: 98.5 F

## 2023-12-04 PROCEDURE — G0157 HHC PT ASSISTANT EA 15: HCPCS

## 2023-12-04 ASSESSMENT — ENCOUNTER SYMPTOMS: PAIN LOCATION - PAIN QUALITY: ACHING

## 2023-12-06 ENCOUNTER — HOME CARE VISIT (OUTPATIENT)
Dept: SCHEDULING | Facility: HOME HEALTH | Age: 58
End: 2023-12-06
Payer: COMMERCIAL

## 2023-12-06 VITALS
RESPIRATION RATE: 17 BRPM | OXYGEN SATURATION: 98 % | SYSTOLIC BLOOD PRESSURE: 122 MMHG | HEART RATE: 78 BPM | TEMPERATURE: 98.1 F | DIASTOLIC BLOOD PRESSURE: 72 MMHG

## 2023-12-06 PROCEDURE — G0157 HHC PT ASSISTANT EA 15: HCPCS

## 2023-12-06 ASSESSMENT — ENCOUNTER SYMPTOMS: PAIN LOCATION - PAIN QUALITY: ACHING

## 2023-12-11 ENCOUNTER — HOME CARE VISIT (OUTPATIENT)
Dept: SCHEDULING | Facility: HOME HEALTH | Age: 58
End: 2023-12-11
Payer: COMMERCIAL

## 2023-12-11 VITALS
OXYGEN SATURATION: 97 % | TEMPERATURE: 98.1 F | SYSTOLIC BLOOD PRESSURE: 116 MMHG | DIASTOLIC BLOOD PRESSURE: 80 MMHG | HEART RATE: 72 BPM | RESPIRATION RATE: 17 BRPM

## 2023-12-11 PROCEDURE — G0157 HHC PT ASSISTANT EA 15: HCPCS

## 2023-12-11 ASSESSMENT — ENCOUNTER SYMPTOMS: PAIN LOCATION - PAIN QUALITY: ACHING

## 2023-12-13 ENCOUNTER — HOME CARE VISIT (OUTPATIENT)
Dept: SCHEDULING | Facility: HOME HEALTH | Age: 58
End: 2023-12-13
Payer: COMMERCIAL

## 2023-12-13 VITALS
RESPIRATION RATE: 18 BRPM | HEART RATE: 76 BPM | OXYGEN SATURATION: 98 % | DIASTOLIC BLOOD PRESSURE: 72 MMHG | SYSTOLIC BLOOD PRESSURE: 124 MMHG | TEMPERATURE: 98 F

## 2023-12-13 PROCEDURE — G0151 HHCP-SERV OF PT,EA 15 MIN: HCPCS

## 2023-12-13 ASSESSMENT — ENCOUNTER SYMPTOMS: PAIN LOCATION - PAIN QUALITY: STRONG ACHE

## 2023-12-14 ENCOUNTER — OFFICE VISIT (OUTPATIENT)
Dept: ORTHOPEDIC SURGERY | Age: 58
End: 2023-12-14

## 2023-12-14 DIAGNOSIS — Z96.652 STATUS POST LEFT KNEE REPLACEMENT: Primary | ICD-10-CM

## 2023-12-14 DIAGNOSIS — Z09 FOLLOW-UP EXAMINATION FOLLOWING SURGERY: ICD-10-CM

## 2023-12-14 PROCEDURE — 99024 POSTOP FOLLOW-UP VISIT: CPT | Performed by: ORTHOPAEDIC SURGERY

## 2023-12-14 RX ORDER — ZOLPIDEM TARTRATE 5 MG/1
5 TABLET ORAL NIGHTLY PRN
Qty: 30 TABLET | Refills: 0 | Status: SHIPPED | OUTPATIENT
Start: 2023-12-14 | End: 2024-01-13

## 2023-12-14 RX ORDER — OXYCODONE HYDROCHLORIDE 5 MG/1
10 TABLET ORAL EVERY 4 HOURS PRN
Qty: 60 TABLET | Refills: 0 | Status: SHIPPED | OUTPATIENT
Start: 2023-12-14 | End: 2023-12-21

## 2023-12-14 NOTE — PROGRESS NOTES
quad atrophy  Palpation: TTP along the incision and medial/lateral knee   Patella mobility: good    ROM Measures:    Right Left Comment   Knee Extension 7 18    Knee Flexion 119 79 85 degress following heel slides   Hip [facility]    Ankle [facility] WFL            Strength/MMT (0-5 Scale):   Right Left Comment   Hip Flexion 5/5 5/5    Hip Extension 4 4    Hip Abduction 4 4    Hip Adduction 4 4    Knee Extension 4 3+    Knee Flexion 4 3+    Quad set      Ankle DF 5/5 5/5    Ankle PF 5/5 5/5            Function:  Gait: assistive device used: Straight cane  antalgic gait with decreased weight bearingon the left LE  Stair management:reciprocal ascending, unilateral handrail  Sit to stand: use of arm rests, weight shift to the right  Balance: Fair     Today's treatment consisted of an initial evaluation and:   Therapeutic exercise (36180) x 28 min to develop ROM, strength, endurance and flexibility of surgical knee. HSS with strap  Heel slides with strap  Quad Set with towel   SAQ with 3 s/h    Manual therapy (00161) x 8 min utilizing techniques to improve joint and/or soft tissue mobility, ROM, and function as well as helping to decrease pain/spasms and swelling. Palpation and assessment of soft tissue, muscles, and landmarks   Knee extension mobs    Vasopneumatic Compression (36396) with cold x 15 minutes: to surgical knee in order to reduce inflammation and swelling/joint effusion which will help improve ROM and manage pain. Patient Education on the condition/pathology, involved anatomy, and exercise rationale. Patient also instructed on the performance of a HEP as noted below. Access Code: [email or web address]  URL: [email or web address]  Date: 12/14/2023  Prepared by:  Romy Andersen    Exercises  - Long Sitting Quad Set with [employer] Under Heel  - 1 x daily - 7 x weekly - 3 sets - 10 reps - 5 sec hold  - Supine Heel Slide with Strap  - 1 x daily - 7 x weekly - 3 sets - 10 reps  - Active Straight Leg Raise with

## 2023-12-14 NOTE — PROGRESS NOTES
with PT/OT transitioning from home health PT to outpatient physical therapy. I have asked the patient not to submerge the incision under water for another couple of weeks and refrain from placing any creams or oils over the incision. If needed they are given a prescription for a refill on their pain medication today. I will plan to check them back in 3 months for a routine 4 month follow-up.       Signed By: Santo Marinelli MD  December 14, 2023

## 2023-12-15 ENCOUNTER — EVALUATION (OUTPATIENT)
Age: 58
End: 2023-12-15

## 2023-12-15 DIAGNOSIS — M25.561 CHRONIC PAIN OF BOTH KNEES: ICD-10-CM

## 2023-12-15 DIAGNOSIS — G89.29 CHRONIC PAIN OF BOTH KNEES: ICD-10-CM

## 2023-12-15 DIAGNOSIS — M25.562 ACUTE PAIN OF LEFT KNEE: ICD-10-CM

## 2023-12-15 DIAGNOSIS — R26.2 DIFFICULTY WALKING: ICD-10-CM

## 2023-12-15 DIAGNOSIS — M17.12 OSTEOARTHRITIS OF LEFT KNEE, UNSPECIFIED OSTEOARTHRITIS TYPE: Primary | ICD-10-CM

## 2023-12-15 DIAGNOSIS — Z96.652 STATUS POST LEFT KNEE REPLACEMENT: ICD-10-CM

## 2023-12-15 DIAGNOSIS — M25.562 CHRONIC PAIN OF BOTH KNEES: ICD-10-CM

## 2023-12-27 ENCOUNTER — TREATMENT (OUTPATIENT)
Age: 58
End: 2023-12-27
Payer: COMMERCIAL

## 2023-12-27 DIAGNOSIS — R53.1 WEAKNESS GENERALIZED: ICD-10-CM

## 2023-12-27 DIAGNOSIS — R26.2 DIFFICULTY WALKING: ICD-10-CM

## 2023-12-27 DIAGNOSIS — M25.562 ACUTE PAIN OF LEFT KNEE: Primary | ICD-10-CM

## 2023-12-27 DIAGNOSIS — M25.662 DECREASED RANGE OF MOTION (ROM) OF LEFT KNEE: ICD-10-CM

## 2023-12-27 PROCEDURE — 97530 THERAPEUTIC ACTIVITIES: CPT

## 2023-12-27 PROCEDURE — 97110 THERAPEUTIC EXERCISES: CPT

## 2023-12-27 PROCEDURE — 97140 MANUAL THERAPY 1/> REGIONS: CPT

## 2023-12-27 NOTE — PROGRESS NOTES
of surgical knee. PROM  Knee  Flexion  extension  Stretching  Gastroc (half round)    Therapeutic activities (25417) x 15 min using dynamic activities to improve function. Steps  Lateral   Alternating (6\", TRX long) 3x60s  Walking  Level surface ambulation, 60s  Squat  Taps (bench) 3x15  Split (2x airex, TRX mid, CGA) 1x6 each*  Deadlift  Italian  TRX long, 2x15    Patient Education   Closed incision allowing return to baths  HEP addition of Sunil stretch     ASSESSMENT     Patient demonstrates:  Progression of flexion ROM relative to start of previous treatment     He has improvements in:  Flexion and extension ROM s/p manual  Load and activity tolerance, with  Advancement of functional movements with increased complexity  Stability, with use of unstable handhold when assistance is necessary     Continues with:  Onset of soreness in higher level activities  Deficits in ROM, strength, and function secondary to L TKA     PLAN     Continue therapy per POC. Manual treatment for restoration of ROM and symptom modulation PRN. Progress activities for self mobilization and LQ strength in isolation and integration to functional activities. PLAN OF CARE     Effective Dates/Duration: 12/15/2023 TO 3/14/2024 (90 days).     Frequency: 2x/week   Interventions may include but are not limited to: (23515) Therapeutic exercise to develop ROM, strength, endurance and flexibility  (64922) Therapeutic activities using dynamic activities to improve function  (34994) Gait training to address mechanics, proper step length and weight shifting to improve household and community mobility as well as overall safety with ADLs  (92083) Manual therapy techniques to improve joint and/or soft tissue mobility, ROM, and function as well as helping to decrease pain/spasms and swelling  (93104) Neuromuscular reeducation addressing impaired balance, coordination, kinesthetic sense, posture and proprioception  (53429/44985) Dry needling for the

## 2023-12-29 ENCOUNTER — TREATMENT (OUTPATIENT)
Age: 58
End: 2023-12-29

## 2023-12-29 DIAGNOSIS — M25.562 ACUTE PAIN OF LEFT KNEE: Primary | ICD-10-CM

## 2023-12-29 DIAGNOSIS — R53.1 WEAKNESS GENERALIZED: ICD-10-CM

## 2023-12-29 DIAGNOSIS — R26.2 DIFFICULTY WALKING: ICD-10-CM

## 2023-12-29 DIAGNOSIS — Z96.652 STATUS POST LEFT KNEE REPLACEMENT: Primary | ICD-10-CM

## 2023-12-29 DIAGNOSIS — M25.662 DECREASED RANGE OF MOTION (ROM) OF LEFT KNEE: ICD-10-CM

## 2023-12-29 NOTE — PROGRESS NOTES
PLAN     Continue therapy per POC. Manual treatment for restoration of ROM and symptom modulation PRN. Progress activities for self mobilization and LQ strength in isolation and integration to functional activities. PLAN OF CARE     Effective Dates/Duration: 12/15/2023 TO 3/14/2024 (90 days). Frequency: 2x/week   Interventions may include but are not limited to: (39433) Therapeutic exercise to develop ROM, strength, endurance and flexibility  (16905) Therapeutic activities using dynamic activities to improve function  (54757) Gait training to address mechanics, proper step length and weight shifting to improve household and community mobility as well as overall safety with ADLs  (56996) Manual therapy techniques to improve joint and/or soft tissue mobility, ROM, and function as well as helping to decrease pain/spasms and swelling  (58671) Neuromuscular reeducation addressing impaired balance, coordination, kinesthetic sense, posture and proprioception  (69968/99127) Dry needling for the management of neuromusculoskeletal pain and movement impairment  Home exercise program (HEP) development  Modalities prn to address pain, spasms, and swelling: (11034) Electrical stimulation - attended  (33251/) Electrical stimulation- unattended  (42877) Vasopneumatic compression  (05291) Hot/cold pack    GOALS     Goals:  Short term goals to be met by 1/12/2024  (4 weeks):  Pt will demonstrate good recall of HEP requiring minimal verbal cuing for proper form and technique. Pt will be able to modify activities in order keep pain to minimal levels (= 3/10) with ADLs. Pt will report pain </= 3/10 to improve performance of mobility related ADLs. Increase knee AROM of involved LE to 100 of flexion degrees, in order to overall mobility. Pt to demonstrate increased weight shift onto involved LE using heel>toe gait pattern to increase stability when walking.     Long term goals to be met by 3/14/2024 (90 days):  Pt will

## 2024-01-01 RX ORDER — CYCLOBENZAPRINE HCL 5 MG
5 TABLET ORAL 2 TIMES DAILY PRN
Qty: 20 TABLET | Refills: 0 | Status: SHIPPED | OUTPATIENT
Start: 2024-01-01 | End: 2024-01-11

## 2024-01-04 ENCOUNTER — TREATMENT (OUTPATIENT)
Age: 59
End: 2024-01-04

## 2024-01-04 DIAGNOSIS — R53.1 WEAKNESS GENERALIZED: ICD-10-CM

## 2024-01-04 DIAGNOSIS — M25.562 ACUTE PAIN OF LEFT KNEE: Primary | ICD-10-CM

## 2024-01-04 DIAGNOSIS — R26.2 DIFFICULTY WALKING: ICD-10-CM

## 2024-01-04 DIAGNOSIS — M25.662 DECREASED RANGE OF MOTION (ROM) OF LEFT KNEE: ICD-10-CM

## 2024-01-04 NOTE — PROGRESS NOTES
[facility]  [address] [place] [address]  Dept: [phone]      Physical Therapy Daily Note     Referring MD: Amanuel Bailon MD  Diagnosis:     ICD-10-CM    1. Acute pain of left knee  M25.562       2. Difficulty walking  R26.2       3. Decreased range of motion (ROM) of left knee  M25.662       4. 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    Marijuana use     Neurological disorder     migraines    Primary osteoarthritis of left knee 09/13/2023    Seizures (HCC)     due to pork--caused migraine and then seizure      Past Surgical History:   Procedure Laterality Date    TOTAL KNEE ARTHROPLASTY Left 11/22/2023    LEFT KNEE TOTAL ARTHROPLASTY ROBOTIC, Carmelina/MARLON, Prevena, Vanc, Cellerate, Tobra/ SDD performed by Amanuel Bailon MD at [facility] OR     Medications: reviewed in chart   Allergies:   Allergies   Allergen Reactions    Pork Allergy Other (See Comments)     Other reaction(s): Unknown-Unspecified  Migraine then seizures        Chief complaints/history of injury:    Date symptoms began: Chronic left knee pain that started in 2020 when his right knee giving him issues  Nature of condition: Chronic (continuous duration > 3 months)  Primary cause of current episode: Post-surgical  How did symptoms start: TKR  Describe current symptoms: tight, throbbing surgical pain with knee tightness    Received previous outpatient therapy? No    Pain Assessment:  Pain location: Left knee    Average Pain/symptom intensity (0-10 scale)  Last 24 hours: 5/10  Last week (1-7 days): 7/10  How often do you feel symptoms? Constant

## 2024-01-08 ENCOUNTER — TREATMENT (OUTPATIENT)
Age: 59
End: 2024-01-08
Payer: COMMERCIAL

## 2024-01-08 DIAGNOSIS — M25.662 DECREASED RANGE OF MOTION (ROM) OF LEFT KNEE: ICD-10-CM

## 2024-01-08 DIAGNOSIS — R53.1 WEAKNESS GENERALIZED: ICD-10-CM

## 2024-01-08 DIAGNOSIS — R26.2 DIFFICULTY WALKING: ICD-10-CM

## 2024-01-08 DIAGNOSIS — M25.562 ACUTE PAIN OF LEFT KNEE: Primary | ICD-10-CM

## 2024-01-08 PROCEDURE — 97530 THERAPEUTIC ACTIVITIES: CPT

## 2024-01-08 PROCEDURE — 97110 THERAPEUTIC EXERCISES: CPT

## 2024-01-08 PROCEDURE — 97140 MANUAL THERAPY 1/> REGIONS: CPT

## 2024-01-08 NOTE — PROGRESS NOTES
[facility]  [address] [place] [address]  Dept: [phone]      Physical Therapy Daily Note     Referring MD: Amanuel Bailon MD  Diagnosis:     ICD-10-CM    1. Acute pain of left knee  M25.562       2. Difficulty walking  R26.2       3. Decreased range of motion (ROM) of left knee  M25.662       4. 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    Marijuana use     Neurological disorder     migraines    Primary osteoarthritis of left knee 09/13/2023    Seizures (HCC)     due to pork--caused migraine and then seizure      Past Surgical History:   Procedure Laterality Date    TOTAL KNEE ARTHROPLASTY Left 11/22/2023    LEFT KNEE TOTAL ARTHROPLASTY ROBOTIC, Carmelina/MARLON, Prevena, Vanc, Cellerate, Tobra/ SDD performed by Amanuel Bailon MD at [facility] MAIN OR     Medications: reviewed in chart   Allergies:   Allergies   Allergen Reactions    Pork Allergy Other (See Comments)     Other reaction(s): Unknown-Unspecified  Migraine then seizures        Chief complaints/history of injury:    Date symptoms began: Chronic left knee pain that started in 2020 when his right knee giving him issues  Nature of condition: Chronic (continuous duration > 3 months)  Primary cause of current episode: Post-surgical  How did symptoms start: TKR  Describe current symptoms: tight, throbbing surgical pain with knee tightness    Received previous outpatient therapy? No    Pain Assessment:  Pain location: Left knee    Average Pain/symptom intensity (0-10 scale)  Last 24 hours: 5/10  Last week (1-7 days): 7/10  How often do you feel symptoms? Constant

## 2024-01-11 ENCOUNTER — TREATMENT (OUTPATIENT)
Age: 59
End: 2024-01-11

## 2024-01-11 DIAGNOSIS — M25.662 DECREASED RANGE OF MOTION (ROM) OF LEFT KNEE: ICD-10-CM

## 2024-01-11 DIAGNOSIS — R26.2 DIFFICULTY WALKING: ICD-10-CM

## 2024-01-11 DIAGNOSIS — R53.1 WEAKNESS GENERALIZED: ICD-10-CM

## 2024-01-11 DIAGNOSIS — M25.562 ACUTE PAIN OF LEFT KNEE: Primary | ICD-10-CM

## 2024-01-11 NOTE — PROGRESS NOTES
(% of time)  Description: aching and stabbing  Aggravating factors: prolonged sitting, prolonged standing, and transferring sit-stand  Alleviating factors: medication: oxycodone, rest, and ice    Neuro screen:  notes some sharp pain into his foot but no N/T    Patient Stated Goals: to sleep better and get back to work    Initial Evaluation: 12/15/23  Last Progress Note: n/a  Total Visits: 8     Total Timed Codes: 45 min, Total Treatment Time: 45 min  Modifier needed: No    SUBJECTIVE     Pt reports feeling stiff this morning, and he finds it frustrating, as he was on a run of good days where he felt he was constantly progressing.    Medications: no changes since last session    OBJECTIVE     Observation:   Posture: Weight shift to the right  Swelling/Edema: minimal    Skin Integrity: incision well healed, no signs of infection, and steri-strips intact   Atrophy: mild quad atrophy  Palpation: No TTP globally  Patella mobility: no restrictions (all planes), hypertonic quads (L, mod)    ROM Measures:    Right Left Comment   Knee Extension 3 -5 1 s/p manual   Knee Flexion 133 98 103 s/p manual   Hip WFL WFL    Ankle WFL WFL            Function:  Gait: assistive device used: SPC (RUE, modified 2 point pattern)  antalgic L  Sit to stand: mild R weight shift    Treatment provided today:  Manual therapy (73244) x 10 min utilizing techniques to improve joint and/or soft tissue mobility, ROM, and function as well as helping to decrease pain/spasms and swelling.  Palpation and assessment of soft tissue, muscles, and landmarks   Knee mobilization  Flexion (popliteal wedge) grade 2-4  Extension (anterior tibial glide) grade 2-4    Therapeutic exercise (84753) x 20 min to develop ROM, strength, endurance and flexibility of surgical knee.   Upright cycle (seat 5, alternating pumps) 5min  PROM  Knee  Flexion  extension  Hack squatBLE, 1x15 (100lbs) ULE, 2x15 (60lbs)  Therapeutic activities (63541) x 15 min using dynamic

## 2024-01-15 ENCOUNTER — TREATMENT (OUTPATIENT)
Age: 59
End: 2024-01-15
Payer: COMMERCIAL

## 2024-01-15 DIAGNOSIS — R53.1 WEAKNESS GENERALIZED: ICD-10-CM

## 2024-01-15 DIAGNOSIS — R26.2 DIFFICULTY WALKING: ICD-10-CM

## 2024-01-15 DIAGNOSIS — M25.562 ACUTE PAIN OF LEFT KNEE: Primary | ICD-10-CM

## 2024-01-15 DIAGNOSIS — M25.662 DECREASED RANGE OF MOTION (ROM) OF LEFT KNEE: ICD-10-CM

## 2024-01-15 PROCEDURE — 97110 THERAPEUTIC EXERCISES: CPT

## 2024-01-15 PROCEDURE — 97140 MANUAL THERAPY 1/> REGIONS: CPT

## 2024-01-15 PROCEDURE — 97530 THERAPEUTIC ACTIVITIES: CPT

## 2024-01-15 NOTE — PROGRESS NOTES
[facility] ORTHOPAEDICS  [address] [place] [address]  Dept: [phone]      Physical Therapy Updated Plan of Care     Referring MD: Amanuel Bailon MD  Diagnosis:     ICD-10-CM    1. Acute pain of left knee  M25.562       2. Difficulty walking  R26.2       3. Decreased range of motion (ROM) of left knee  M25.662       4. 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    Marijuana use     Neurological disorder     migraines    Primary osteoarthritis of left knee 09/13/2023    Seizures (HCC)     due to pork--caused migraine and then seizure      Past Surgical History:   Procedure Laterality Date    TOTAL KNEE ARTHROPLASTY Left 11/22/2023    LEFT KNEE TOTAL ARTHROPLASTY ROBOTIC, [place]/MARLON, Prevena, Vanc, Cellerate, Tobra/ SDD performed by Amanuel Bailon MD at [facility] MAIN OR     Medications: reviewed in chart   Allergies:   Allergies   Allergen Reactions    Pork Allergy Other (See Comments)     Other reaction(s): Unknown-Unspecified  Migraine then seizures        Chief complaints/history of injury:    Date symptoms began: Chronic left knee pain that started in 2020 when his right knee giving him issues  Nature of condition: Chronic (continuous duration > 3 months)  Primary cause of current episode: Post-surgical  How did symptoms start: TKR  Describe current symptoms: tight, throbbing surgical pain with knee tightness    Received previous outpatient therapy? No    Pain Assessment:  Pain location: Left knee    Average Pain/symptom intensity (0-10 scale)  Last 24 hours: 5/10  Last week (1-7 days): 7/10  How often do you feel symptoms?

## 2024-01-17 DIAGNOSIS — G89.18 POSTOPERATIVE PAIN: ICD-10-CM

## 2024-01-18 ENCOUNTER — TREATMENT (OUTPATIENT)
Age: 59
End: 2024-01-18

## 2024-01-18 DIAGNOSIS — M25.562 ACUTE PAIN OF LEFT KNEE: Primary | ICD-10-CM

## 2024-01-18 DIAGNOSIS — M25.662 DECREASED RANGE OF MOTION (ROM) OF LEFT KNEE: ICD-10-CM

## 2024-01-18 DIAGNOSIS — R26.2 DIFFICULTY WALKING: ICD-10-CM

## 2024-01-18 DIAGNOSIS — R53.1 WEAKNESS GENERALIZED: ICD-10-CM

## 2024-01-18 RX ORDER — ZOLPIDEM TARTRATE 5 MG/1
5 TABLET ORAL NIGHTLY PRN
Qty: 60 TABLET | Refills: 0 | Status: SHIPPED | OUTPATIENT
Start: 2024-01-18 | End: 2024-03-18

## 2024-01-18 NOTE — PROGRESS NOTES
[facility] ORTHOPAEDICS  [address] [place] [id number]  Dept: [phone]      Physical Therapy Updated Plan of Care     Referring MD: Amanuel Bailon MD  Diagnosis:     ICD-10-CM    1. Acute pain of left knee  M25.562       2. Difficulty walking  R26.2       3. Decreased range of motion (ROM) of left knee  M25.662       4. 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    Marijuana use     Neurological disorder     migraines    Primary osteoarthritis of left knee 09/13/2023    Seizures (HCC)     due to pork--caused migraine and then seizure      Past Surgical History:   Procedure Laterality Date    TOTAL KNEE ARTHROPLASTY Left 11/22/2023    LEFT KNEE TOTAL ARTHROPLASTY ROBOTIC, [place]/MARLON, Prevena, Vanc, Cellerate, Tobra/ SDD performed by Amanuel Bailon MD at [facility] [place]        PN (1/15/24)  Nature of condition: Recent onset (initial onset within last 3 months)  Describe current symptoms: morning stiffness    Pain Assessment:  Pain location: L quadriceps      How often do you feel symptoms? Occasionally (26-50%)    How much have your symptoms interfered with daily activities? Moderately  How has your condition changed since receiving care at this facility? Much better  In general, would you say your current overall health is very good     Functional Outcome Measures: Lower Extremity Functional Scale: 30/80= 37.5% function    Neuro screen:  notes some sharp pain into his foot but no N/T    Patient Stated Goals: to sleep better and get back to work    Initial Evaluation: 12/15/23  Last Progress Note: n/a  Total Visits: 10

## 2024-01-22 ENCOUNTER — TREATMENT (OUTPATIENT)
Age: 59
End: 2024-01-22
Payer: COMMERCIAL

## 2024-01-22 DIAGNOSIS — M25.662 DECREASED RANGE OF MOTION (ROM) OF LEFT KNEE: ICD-10-CM

## 2024-01-22 DIAGNOSIS — R53.1 WEAKNESS GENERALIZED: ICD-10-CM

## 2024-01-22 DIAGNOSIS — M25.562 ACUTE PAIN OF LEFT KNEE: Primary | ICD-10-CM

## 2024-01-22 DIAGNOSIS — R26.2 DIFFICULTY WALKING: ICD-10-CM

## 2024-01-22 PROCEDURE — 97140 MANUAL THERAPY 1/> REGIONS: CPT

## 2024-01-22 PROCEDURE — 97530 THERAPEUTIC ACTIVITIES: CPT

## 2024-01-22 PROCEDURE — 97110 THERAPEUTIC EXERCISES: CPT

## 2024-01-22 NOTE — PROGRESS NOTES
[facility] ORTHOPAEDICS  [address] [facility] [address]  Dept: [phone]      Physical Therapy Daily Note     Referring MD: Amanuel Bailon MD  Diagnosis:     ICD-10-CM    1. Acute pain of left knee  M25.562       2. Difficulty walking  R26.2       3. Decreased range of motion (ROM) of left knee  M25.662       4. Weakness generalized  R53.1          Surgery: left TKA Date: 11/22/23  Therapy precautions:Fall risk and Weight-bearing Status: WBAT  Co-morbidities affecting plan of care: HTN     PN (1/15/24)  Nature of condition: Recent onset (initial onset within last 3 months)  Describe current symptoms: morning stiffness    Pain Assessment:  Pain location: L quadriceps      How often do you feel symptoms? Occasionally (26-50%)    How much have your symptoms interfered with daily activities? Moderately  How has your condition changed since receiving care at this facility? Much better  In general, would you say your current overall health is very good     Functional Outcome Measures: Lower Extremity Functional Scale: 30/80= 37.5% function    Neuro screen:  notes some sharp pain into his foot but no N/T    Patient Stated Goals: to sleep better and get back to work    Initial Evaluation: 12/15/23  Last Progress Note: 1/15/24  Total Visits: 11     Total Timed Codes: 40 min, Total Treatment Time: 40 min  Modifier needed: No    SUBJECTIVE     Pt reports his usual stiffness, but wants to continue to push through, as he is hoping to return to work by the middle of February.    Medications: no changes since last session    OBJECTIVE     Observation:   Posture: Weight shift to the right  Swelling/Edema: minimal    Skin Integrity: incision well healed, no signs of infection, and steri-strips intact   Atrophy: mild quad atrophy  Palpation: No TTP globally  Patella mobility: no restrictions (all planes)    ROM Measures:    Right Left Comment   Knee Extension -1 -5 -1 s/p manual   Knee Flexion 139 105 115 s/p

## 2024-01-25 ENCOUNTER — TREATMENT (OUTPATIENT)
Age: 59
End: 2024-01-25

## 2024-01-25 DIAGNOSIS — M25.662 DECREASED RANGE OF MOTION (ROM) OF LEFT KNEE: ICD-10-CM

## 2024-01-25 DIAGNOSIS — R26.2 DIFFICULTY WALKING: ICD-10-CM

## 2024-01-25 DIAGNOSIS — R53.1 WEAKNESS GENERALIZED: ICD-10-CM

## 2024-01-25 DIAGNOSIS — M25.562 ACUTE PAIN OF LEFT KNEE: Primary | ICD-10-CM

## 2024-01-25 NOTE — PROGRESS NOTES
pain/spasms and swelling  Home exercise program (HEP) development    GOALS     Long term goals to be met by 2/14/2024 (30 days).   Pt will demonstrate knee flexion that is within 10 degrees of uninvolved LE for improved participation in ADLs.  Pt will perform an independent SLR with extension lag <5 degrees for improved knee stability in gait.   Pt will demonstrate MMT of 5/5 globally to allow for normal ADL's and functional activities and for the return to work activities.  Pt will perform at least 15 repetitions in 30s Chair Stand assessment, keeping weight evenly distributed through R and L LEs; demonstrating improved function of surgical LE and a reduction in compensations due to pain and weakness.   Pt will demonstrate knee extension and flexion strength of involved limb within 70% of uninvolved limb for progression to running activities  Improve LEFS to 60/80 demonstrating improved functional mobility.      [employer]  Access Code: [email or web address]  URL: [email or web address]  Date: 12/19/2023  Prepared by: Addy Allen    Exercises  - Quad Set with Strap  - 4 x daily - 2 sets - 15 reps - 5 hold  - Supine Heel Slide with Strap  - 4 x daily - 2 sets - 20 reps - 5 hold  - Active Straight Leg Raise with Quad Set  - 1 x daily - 3 sets - 10 reps  - Seated Long Arc Quad  - 1 x daily - 3 sets - 10 reps  - Sit to Stand with Armchair  - 1 x daily - 3 sets - 15 reps  - Side Stepping with Counter Support  - 1 x daily - 3 sets - 15 reps  - Standing Hip Extension with Counter Support  - 1 x daily - 3 sets - 10 reps  - Heel Toe Raises with Counter Support  - 1 x daily - 3 sets - 10 reps - 2 hold

## 2024-01-29 ENCOUNTER — TREATMENT (OUTPATIENT)
Age: 59
End: 2024-01-29
Payer: COMMERCIAL

## 2024-01-29 DIAGNOSIS — R53.1 WEAKNESS GENERALIZED: ICD-10-CM

## 2024-01-29 DIAGNOSIS — M25.662 DECREASED RANGE OF MOTION (ROM) OF LEFT KNEE: ICD-10-CM

## 2024-01-29 DIAGNOSIS — M25.562 ACUTE PAIN OF LEFT KNEE: Primary | ICD-10-CM

## 2024-01-29 PROCEDURE — 97110 THERAPEUTIC EXERCISES: CPT

## 2024-01-29 PROCEDURE — 97530 THERAPEUTIC ACTIVITIES: CPT

## 2024-01-29 PROCEDURE — 97140 MANUAL THERAPY 1/> REGIONS: CPT

## 2024-01-29 NOTE — PROGRESS NOTES
[facility] ORTHOPAED[facility]  [address] [facility] [address]  Dept: [phone]      Physical Therapy Daily Note     Referring MD: Amanuel Bailon MD  Diagnosis:     ICD-10-CM    1. Acute pain of left knee  M25.562       2. Decreased range of motion (ROM) of left knee  M25.662       3. Weakness generalized  R53.1          Surgery: left TKA Date: 11/22/23  Therapy precautions:Fall risk and Weight-bearing Status: WBAT  Co-morbidities affecting plan of care: HTN     PN (1/15/24)  Nature of condition: Recent onset (initial onset within last 3 months)  Describe current symptoms: morning stiffness    Pain Assessment:  Pain location: L quadriceps      How often do you feel symptoms? Occasionally (26-50%)    How much have your symptoms interfered with daily activities? Moderately  How has your condition changed since receiving care at this facility? Much better  In general, would you say your current overall health is very good     Functional Outcome Measures: Lower Extremity Functional Scale: 30/80= 37.5% function    Neuro screen:  notes some sharp pain into his foot but no N/T    Patient Stated Goals: to sleep better and get back to work    Initial Evaluation: 12/15/23  Last Progress Note: 1/15/24  Total Visits: 13     Total Timed Codes: 45 min, Total Treatment Time: 45 min  Modifier needed: No    SUBJECTIVE     Pt reports his usual morning stiffness this morning, but pain in the joint. He asks about expectations for RTW, as he has been receiving contact from his short term disability provider.    Medications: no changes since last session    OBJECTIVE     Observation:   Posture: Weight shift to the right  Swelling/Edema: minimal    Skin Integrity: incision well healed   Atrophy: mild quad atrophy  Palpation: No TTP globally, hypertonic RF (mod)  Patella mobility: no restrictions (all planes)    ROM Measures:    Right Left Comment   Knee Extension -1 -4 0 s/p treatment   Knee Flexion 139 108 115 s/p

## 2024-02-01 ENCOUNTER — TREATMENT (OUTPATIENT)
Age: 59
End: 2024-02-01
Payer: COMMERCIAL

## 2024-02-01 DIAGNOSIS — R53.1 WEAKNESS GENERALIZED: ICD-10-CM

## 2024-02-01 DIAGNOSIS — M25.662 DECREASED RANGE OF MOTION (ROM) OF LEFT KNEE: ICD-10-CM

## 2024-02-01 DIAGNOSIS — R26.2 DIFFICULTY WALKING: ICD-10-CM

## 2024-02-01 DIAGNOSIS — M25.562 ACUTE PAIN OF LEFT KNEE: Primary | ICD-10-CM

## 2024-02-01 PROCEDURE — 97530 THERAPEUTIC ACTIVITIES: CPT

## 2024-02-01 PROCEDURE — 97140 MANUAL THERAPY 1/> REGIONS: CPT

## 2024-02-01 PROCEDURE — 97110 THERAPEUTIC EXERCISES: CPT

## 2024-02-01 NOTE — PROGRESS NOTES
[facility] ORTHOPAEDICS  [address] [facility] [place] [address]  Dept: [phone]      Physical Therapy Daily Note     Referring MD: Amanuel Bailon MD  Diagnosis:     ICD-10-CM    1. Acute pain of left knee  M25.562       2. Decreased range of motion (ROM) of left knee  M25.662       3. Weakness generalized  R53.1       4. Difficulty walking  R26.2          Surgery: left TKA Date: 11/22/23  Therapy precautions:Fall risk and Weight-bearing Status: WBAT  Co-morbidities affecting plan of care: HTN     PN (1/15/24)  Nature of condition: Recent onset (initial onset within last 3 months)  Describe current symptoms: morning stiffness    Pain Assessment:  Pain location: L quadriceps      How often do you feel symptoms? Occasionally (26-50%)    How much have your symptoms interfered with daily activities? Moderately  How has your condition changed since receiving care at this facility? Much better  In general, would you say your current overall health is very good     Functional Outcome Measures: Lower Extremity Functional Scale: 30/80= 37.5% function    Neuro screen:  notes some sharp pain into his foot but no N/T    Patient Stated Goals: to sleep better and get back to work    Initial Evaluation: 12/15/23  Last Progress Note: 1/15/24  Total Visits: 14     Total Timed Codes: 50 min, Total Treatment Time: 50 min  Modifier needed: No    SUBJECTIVE     Pt reports feeling tight in his thigh this morning, but otherwise thinks he is walking well and is getting closer to being ready for work. He asks about receiving RTW clearance from his MD, including potential restrictions.    Medications: no changes since last session    OBJECTIVE     Observation:   Posture: Weight shift to the right  Swelling/Edema: minimal    Skin Integrity: incision well healed   Atrophy: mild quad atrophy  Palpation: No TTP globally, hypertonic RF (mod)  Patella mobility: no restrictions (all planes)    ROM Measures:    Right Left Comment

## 2024-02-05 ENCOUNTER — TREATMENT (OUTPATIENT)
Age: 59
End: 2024-02-05
Payer: COMMERCIAL

## 2024-02-05 DIAGNOSIS — M25.562 ACUTE PAIN OF LEFT KNEE: Primary | ICD-10-CM

## 2024-02-05 DIAGNOSIS — M25.662 DECREASED RANGE OF MOTION (ROM) OF LEFT KNEE: ICD-10-CM

## 2024-02-05 DIAGNOSIS — R53.1 WEAKNESS GENERALIZED: ICD-10-CM

## 2024-02-05 PROCEDURE — 97530 THERAPEUTIC ACTIVITIES: CPT

## 2024-02-05 PROCEDURE — 97110 THERAPEUTIC EXERCISES: CPT

## 2024-02-05 PROCEDURE — 97140 MANUAL THERAPY 1/> REGIONS: CPT

## 2024-02-05 NOTE — PROGRESS NOTES
[facility] ORTHOPAED[facility]  [address] [facility] [address]  Dept: [phone]      Physical Therapy Daily Note     Referring MD: Amanuel Bailon MD  Diagnosis:     ICD-10-CM    1. Acute pain of left knee  M25.562       2. Decreased range of motion (ROM) of left knee  M25.662       3. Weakness generalized  R53.1          Surgery: left TKA Date: 11/22/23  Therapy precautions:Fall risk and Weight-bearing Status: WBAT  Co-morbidities affecting plan of care: HTN     PN (1/15/24)  Nature of condition: Recent onset (initial onset within last 3 months)  Describe current symptoms: morning stiffness    Pain Assessment:  Pain location: L quadriceps      How often do you feel symptoms? Occasionally (26-50%)    How much have your symptoms interfered with daily activities? Moderately  How has your condition changed since receiving care at this facility? Much better  In general, would you say your current overall health is very good     Functional Outcome Measures: Lower Extremity Functional Scale: 30/80= 37.5% function    Neuro screen:  notes some sharp pain into his foot but no N/T    Patient Stated Goals: to sleep better and get back to work    Initial Evaluation: 12/15/23  Last Progress Note: 1/15/24  Total Visits: 15     Total Timed Codes: 50 min, Total Treatment Time: 50 min  Modifier needed: No    SUBJECTIVE     Pt reports the usual morning stiffness, noting that he tends to feel more stiff when his room is more chilled first thing. He has spoken with his workplace, and has some concerns about their plans for him to immediately return to 12 hour days.    Medications: no changes since last session    OBJECTIVE     Observation:   Posture:  No gross deviations  Swelling/Edema: minimal    Skin Integrity: incision well healed   Atrophy: mild quad atrophy  Palpation: No TTP globally  Patella mobility: no restrictions (all planes)    ROM Measures:    Right Left Comment   Knee Extension -1 -1    Knee Flexion 139

## 2024-02-08 ENCOUNTER — TREATMENT (OUTPATIENT)
Age: 59
End: 2024-02-08
Payer: COMMERCIAL

## 2024-02-08 ENCOUNTER — OFFICE VISIT (OUTPATIENT)
Dept: ORTHOPEDIC SURGERY | Age: 59
End: 2024-02-08

## 2024-02-08 DIAGNOSIS — M25.662 DECREASED RANGE OF MOTION (ROM) OF LEFT KNEE: ICD-10-CM

## 2024-02-08 DIAGNOSIS — Z74.09 DECREASED FUNCTIONAL MOBILITY AND ENDURANCE: ICD-10-CM

## 2024-02-08 DIAGNOSIS — R53.1 WEAKNESS GENERALIZED: ICD-10-CM

## 2024-02-08 DIAGNOSIS — M25.562 ACUTE PAIN OF LEFT KNEE: Primary | ICD-10-CM

## 2024-02-08 DIAGNOSIS — Z09 FOLLOW-UP EXAMINATION FOLLOWING SURGERY: Primary | ICD-10-CM

## 2024-02-08 PROCEDURE — 97140 MANUAL THERAPY 1/> REGIONS: CPT

## 2024-02-08 PROCEDURE — 97110 THERAPEUTIC EXERCISES: CPT

## 2024-02-08 PROCEDURE — 97530 THERAPEUTIC ACTIVITIES: CPT

## 2024-02-08 NOTE — PROGRESS NOTES
Patient ID:  Bran Richey  [id number]  58 y.o.  1965    Today: February 8, 2024    CHIEF COMPLAINT:  Follow-up left total knee replacement    HISTORY:  The patient presents today approximately 3 months after knee replacement.  The patient is doing very well. The patient is able to perform most if not all required and desired activities. Is no longer taking higher dose pain medications and is only requiring occasional over-the-counter anti-inflammatories and/or Tylenol. Continues to work on stretching and strengthening of the limb.    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 Authorizing Provider   zolpidem (AMBIEN) 5 MG tablet Take 1 tablet by mouth nightly as needed for Sleep for up to 60 days. Max Daily Amount: 10 mg 1/18/24 3/18/24  Amanuel Bailon MD   traZODone (DESYREL) 50 MG tablet Take 50 mg by mouth nightly as needed for Sleep.    Provider, MD Carlotta   acetaminophen (TYLENOL) 500 MG tablet Take 2 tablets by mouth every 6 hours as needed for Pain  Patient taking differently: Take 2 tablets by mouth every 6 hours as needed for Pain (breakthrough pain). 11/22/23   Amanuel Bailon MD   aspirin (ASPIRIN 81) 81 MG EC tablet Take 1 tablet by mouth in the morning and at bedtime Take one tablet morning and evening

## 2024-02-08 NOTE — PROGRESS NOTES
[facility] ORTHOPAEDICS  [facility] [address]  Dept: [phone]      Physical Therapy Updated Plan of Care     Referring MD: Amanuel Bailon MD  Diagnosis:     ICD-10-CM    1. Acute pain of left knee  M25.562       2. Decreased range of motion (ROM) of left knee  M25.662       3. Weakness generalized  R53.1       4. Decreased functional mobility and endurance  Z74.09          Surgery: left TKA Date: 11/22/23  Therapy precautions:Fall risk and Weight-bearing Status: WBAT  Co-morbidities affecting plan of care: HTN     PN (1/15/24)  Nature of condition: Recent onset (initial onset within last 3 months)  Describe current symptoms: morning stiffness    Pain Assessment:  Pain location: L quadriceps      How often do you feel symptoms? Occasionally (26-50%)    How much have your symptoms interfered with daily activities? Moderately  How has your condition changed since receiving care at this facility? Much better  In general, would you say your current overall health is very good     Functional Outcome Measures: Lower Extremity Functional Scale: 30/80= 37.5% function    Neuro screen:  notes some sharp pain into his foot but no N/T    Patient Stated Goals: to sleep better and get back to work    Initial Evaluation: 12/15/23  Last Progress Note: 1/15/24  Total Visits: 16     Total Timed Codes: 45 min, Total Treatment Time: 45 min  Modifier needed: No    SUBJECTIVE     Pt reports feeling more stiff than normal this morning, noting that it took more walking after waking to get his stride right.  He is scheduled to be evaluated by his MD this afternoon, with a planned RTW on Monday.      Medications: no changes since last session    Nature of condition: Recent onset (initial onset within last 3 months)  Describe current symptoms: soreness of L quad to patellar tendon    Pain Assessment:  Pain location: L quad    Average Pain/symptom intensity (0-10 scale)  Last 24 hours: 4/10  Last week (1-7

## 2024-02-14 ENCOUNTER — TREATMENT (OUTPATIENT)
Age: 59
End: 2024-02-14
Payer: COMMERCIAL

## 2024-02-14 DIAGNOSIS — M25.562 ACUTE PAIN OF LEFT KNEE: Primary | ICD-10-CM

## 2024-02-14 DIAGNOSIS — Z74.09 DECREASED FUNCTIONAL MOBILITY AND ENDURANCE: ICD-10-CM

## 2024-02-14 DIAGNOSIS — M25.662 DECREASED RANGE OF MOTION (ROM) OF LEFT KNEE: ICD-10-CM

## 2024-02-14 PROCEDURE — 97110 THERAPEUTIC EXERCISES: CPT

## 2024-02-14 PROCEDURE — 97140 MANUAL THERAPY 1/> REGIONS: CPT

## 2024-02-14 NOTE — PROGRESS NOTES
mobility, ROM, and function as well as helping to decrease pain/spasms and swelling  Home exercise program (HEP) development    GOALS     Long term goals to be met by 3/9/2024 (30 days).  Pt will demonstrate knee flexion that is within 10 degrees of uninvolved LE for improved participation in ADLs.   Pt will demonstrate MMT of 5/5 in quad set to allow for normal ADL's and functional activities and for the return to work activities.   Improve LEFS to 60/80 demonstrating improved functional mobility.      [employer]  Access Code: [email or web address]  URL: [email or web address]  Date: 02/14/2024  Prepared by: Addy Allen    Exercises  - Quad Set with Strap  - 2 x daily - 2 sets - 15 reps - 5 hold  - Seated Heel Slide  - 2 x daily - 2 sets - 15 reps - 5 hold  - Lunge onto Step  - 2 x daily - 3 sets - 10 reps  - Active Straight Leg Raise with Quad Set  - 2 x weekly - 3 sets - 10 reps  - Seated Long Arc Quad  - 2 x weekly - 3 sets - 10 reps  - Side Stepping with Resistance at Thighs  - 2 x weekly - 2 sets - 10 reps  - Forward Monster Walk with Resistance (BKA)  - 2 x weekly - 2 sets - 16 reps  - Backward Monster Walk with Resistance (BKA)  - 2 x weekly - 2 sets - 16 reps  - Supine Bridge with Resistance Band  - 2 x weekly - 2 sets - 15 reps  - Squat with Chair Touch and Resistance Loop  - 2 x weekly - 3 sets - 15 reps

## 2024-02-21 ENCOUNTER — TREATMENT (OUTPATIENT)
Age: 59
End: 2024-02-21
Payer: COMMERCIAL

## 2024-02-21 DIAGNOSIS — R26.2 DIFFICULTY WALKING: ICD-10-CM

## 2024-02-21 DIAGNOSIS — R53.1 WEAKNESS GENERALIZED: ICD-10-CM

## 2024-02-21 DIAGNOSIS — M25.662 DECREASED RANGE OF MOTION (ROM) OF LEFT KNEE: ICD-10-CM

## 2024-02-21 DIAGNOSIS — Z74.09 DECREASED FUNCTIONAL MOBILITY AND ENDURANCE: ICD-10-CM

## 2024-02-21 DIAGNOSIS — M25.562 ACUTE PAIN OF LEFT KNEE: Primary | ICD-10-CM

## 2024-02-21 PROCEDURE — 97110 THERAPEUTIC EXERCISES: CPT

## 2024-02-21 PROCEDURE — 97140 MANUAL THERAPY 1/> REGIONS: CPT

## 2024-02-21 PROCEDURE — 97530 THERAPEUTIC ACTIVITIES: CPT

## 2024-02-21 NOTE — PROGRESS NOTES
activities to improve function  (23[address]) Manual therapy techniques to improve joint and/or soft tissue mobility, ROM, and function as well as helping to decrease pain/spasms and swelling  Home exercise program (HEP) development    GOALS     Long term goals to be met by 3/9/2024 (30 days).  Pt will demonstrate knee flexion that is within 10 degrees of uninvolved LE for improved participation in ADLs.   Pt will demonstrate MMT of 5/5 in quad set to allow for normal ADL's and functional activities and for the return to work activities.   Improve LEFS to 60/80 demonstrating improved functional mobility.      [employer]  Access Code: [email or web address]  URL: [email or web address]  Date: 02/14/2024  Prepared by: Addy Allen    Exercises  - Quad Set with Strap  - 2 x daily - 2 sets - 15 reps - 5 hold  - Seated Heel Slide  - 2 x daily - 2 sets - 15 reps - 5 hold  - Lunge onto Step  - 2 x daily - 3 sets - 10 reps  - Active Straight Leg Raise with Quad Set  - 2 x weekly - 3 sets - 10 reps  - Seated Long Arc Quad  - 2 x weekly - 3 sets - 10 reps  - Side Stepping with Resistance at Thighs  - 2 x weekly - 2 sets - 10 reps  - Forward Monster Walk with Resistance (BKA)  - 2 x weekly - 2 sets - 16 reps  - Backward Monster Walk with Resistance (BKA)  - 2 x weekly - 2 sets - 16 reps  - Supine Bridge with Resistance Band  - 2 x weekly - 2 sets - 15 reps  - Squat with Chair Touch and Resistance Loop  - 2 x weekly - 3 sets - 15 reps

## 2024-02-29 ENCOUNTER — TREATMENT (OUTPATIENT)
Age: 59
End: 2024-02-29
Payer: COMMERCIAL

## 2024-02-29 DIAGNOSIS — R53.1 WEAKNESS GENERALIZED: ICD-10-CM

## 2024-02-29 DIAGNOSIS — M25.562 ACUTE PAIN OF LEFT KNEE: Primary | ICD-10-CM

## 2024-02-29 DIAGNOSIS — Z74.09 DECREASED FUNCTIONAL MOBILITY AND ENDURANCE: ICD-10-CM

## 2024-02-29 DIAGNOSIS — M25.662 DECREASED RANGE OF MOTION (ROM) OF LEFT KNEE: ICD-10-CM

## 2024-02-29 PROCEDURE — 97140 MANUAL THERAPY 1/> REGIONS: CPT

## 2024-02-29 PROCEDURE — 97530 THERAPEUTIC ACTIVITIES: CPT

## 2024-02-29 PROCEDURE — 97110 THERAPEUTIC EXERCISES: CPT

## 2024-02-29 NOTE — PROGRESS NOTES
Integrity: incision well healed   Atrophy: mild quad atrophy  Palpation: No TTP globally  Patella mobility: no restrictions (all planes)    ROM Measures:    Right Left Comment   Knee Extension -1 -3 -1 s/p manual   Knee Flexion [phone] s/p manual   Hip WFL WFL    Ankle WFL WFL            Function:  Gait: antalgic L (mild)  Treatment provided today:  Manual therapy (93584) x 10 min utilizing techniques to improve joint and/or soft tissue mobility, ROM, and function as well as helping to decrease pain/spasms and swelling.  Palpation and assessment of soft tissue, muscles, and landmarks   Knee mobilization  Flexion (popliteal wedge) grade 2-4  Extension (anterior tibial glide) grade 2-4    Therapeutic exercise (32677) x 10 min to develop ROM, strength, endurance and flexibility of surgical knee.   PROM  Knee  Flexion  Extension  Repeated motions  Knee  Flexion  Seated   Table slide/alternating extension, 2x60s  Elevated lunge  Therapeutic activities (78375) x 25 min using dynamic activities to improve function.  Steps  Anterior to lateral (B/alternating) 1x60s (airex) 2x60s (BOSU round)  Lungeanterior ([id number]/TRX long) 2x60s each  Squat  German (teal band, TRX long) 2x60s    Patient Education   POC with considerations of D/C    ASSESSMENT     Patient demonstrates:  progression of flexion ROM relative to start of previous treatment, with mild loss of extension  Improving work tolerance     He has improvements in:  AROM s/p treatment, restoring 113 flexion and -1 extension  Stability and movement tolerance, with  Progression of functional activities with unstable EVAN  Use of dynamic resistance against TKE     Continues with:  Deficits in knee ROM     PLAN     Continue therapy per POC. Primary focus on supporting RTW, with maintaining and advancing ROM with manual and mobility activities. Exercise activities focused on stability and dynamic or unilateral loading. Progress assessment to be completed at 3/13

## 2024-03-06 ENCOUNTER — TELEPHONE (OUTPATIENT)
Age: 59
End: 2024-03-06

## 2024-03-06 NOTE — TELEPHONE ENCOUNTER
Pt did not show for scheduled appointment. Called and left message to check on pt and to remind him of his next visit, which is scheduled 3/13/24.  
Alert-The patient is alert, awake and responds to voice. The patient is oriented to time, place, and person. The triage nurse is able to obtain subjective information.

## 2024-03-13 ENCOUNTER — TREATMENT (OUTPATIENT)
Age: 59
End: 2024-03-13
Payer: COMMERCIAL

## 2024-03-13 DIAGNOSIS — M25.662 DECREASED RANGE OF MOTION (ROM) OF LEFT KNEE: ICD-10-CM

## 2024-03-13 DIAGNOSIS — R26.2 DIFFICULTY WALKING: ICD-10-CM

## 2024-03-13 DIAGNOSIS — Z74.09 DECREASED FUNCTIONAL MOBILITY AND ENDURANCE: ICD-10-CM

## 2024-03-13 DIAGNOSIS — M25.562 ACUTE PAIN OF LEFT KNEE: Primary | ICD-10-CM

## 2024-03-13 PROCEDURE — 97140 MANUAL THERAPY 1/> REGIONS: CPT

## 2024-03-13 PROCEDURE — 97110 THERAPEUTIC EXERCISES: CPT

## 2024-03-13 NOTE — PROGRESS NOTES
[facility]  [address] [address]  Dept: [phone]      Physical Therapy Discharge     Referring MD: Amanuel Bailon MD  Diagnosis:     ICD-10-CM    1. Acute pain of left knee  M25.562       2. Decreased range of motion (ROM) of left knee  M25.662       3. Decreased functional mobility and endurance  Z74.09       4. Difficulty walking  R26.2          Surgery: left TKA Date: 11/22/23  Therapy precautions:Fall risk and Weight-bearing Status: WBAT  Co-morbidities affecting plan of care: HTN     PN (2/8/24)  Nature of condition: Recent onset (initial onset within last 3 months)  Describe current symptoms: soreness of L quad to patellar tendon    Pain Assessment:  Pain location: L quad    Average Pain/symptom intensity (0-10 scale)  Last 24 hours: 4/10  Last week (1-7 days): 4/10  How often do you feel symptoms? Intermittently (0-25%)    How much have your symptoms interfered with daily activities? Not at all  How has your condition changed since receiving care at this facility? Much better  In general, would you say your current overall health is very good     Functional Outcome Measures: Lower Extremity Functional Scale: 42/80= 53% function    Patient Stated Goals: to sleep better and get back to work    Initial Evaluation: 12/15/23  Last Progress Note: 2/8/24  Total Visits: 20     Total Timed Codes: 30 min, Total Treatment Time: 30 min  Modifier needed: No    SUBJECTIVE     Pt reports advancement in work load, noting he returned to full shifts on Sunday. He notes global muscular soreness, along with right knee soreness, but the left knee has not been problematic. He has not been as diligent with his HEP ROM activities this week, but acknowledges he consistently feels better on the days he keeps up with them.    Medications: no changes since last session    Nature of condition: Chronic (continuous duration > 3 months)  Describe current symptoms: global muscular

## 2025-05-22 NOTE — ED TRIAGE NOTES
NEPHROLOGY CONSULTATION-----[facility]    [facility]  [phone]  Aman Gregory MD    Patient Care Team:  Abner Funes APRN as PCP - General (Family Medicine)  Brittany Canchola, RN as Nurse Navigator  Arben Tim MD as Consulting Physician (Hematology and Oncology)  Aman Gregory MD as Consulting Physician (Nephrology)    CC/REASON FOR CONSULTATION: Elevated creatinine    PHYSICIAN REQUESTING CONSULTATION: Evelyn Felder MD     History of Present Illness    74Y male with past medical history of coronary artery disease, lung cancer on Keytruda presented to the hospital for removal of port, became dyspneic.  He was noted to have left-sided pneumonia.  His oxygen saturation was low in the 80s.  He reports difficulty voiding and some blood in the urine.  Renal ultrasound showed bilateral hydronephrosis and distended bladder.His creatinine is 2.3.  Previously in February creatinine around 1.6-1.8.  No vomiting or diarrhea.  No NSAIDs.  He is followed by Dr. Tim for lung cancer.    Review of Systems   As noted above otherwise systems were reviewed and were negative.    Past Medical History:   Diagnosis Date    Coronary artery disease     Myocardial infarction 01/31/2024       Past Surgical History:   Procedure Laterality Date    BRONCHOSCOPY N/A 8/13/2024    Procedure: BRONCHOSCOPY WITH BRONCHIAL WASHING AND BRONCHIAL BRUSHING;  Surgeon: Kelvin Gonzalez MD;  Location: [facility] ENDOSCOPY;  Service: Pulmonary;  Laterality: N/A;    BRONCHOSCOPY WITH ION ROBOTIC ASSIST N/A 8/16/2024    Procedure: BRONCHOSCOPY WITH ION ROBOT WITH CRYO BIOPSY;  Surgeon: Kelvin Gonzalez MD;  Location: [facility] ENDOSCOPY;  Service: Robotics - Pulmonary;  Laterality: N/A;    CARDIAC CATHETERIZATION Right 1/29/2024    Procedure: Coronary angiography;  Surgeon: Abran Chand MD;  Location: [facility] CATH INVASIVE LOCATION;  Service: Cardiovascular;  Laterality: Right;    CARDIAC  Pt ambulatory to Room #17 wearing a mask. Pt c/o right hand pain and swelling since yesterday. Pt reports he was bitten by a mosquito, which he killed while being bitten. Pt has swelling and redness to top of hand between thumb and forefinger. Pt reports intense itching. Pt states, \"I didn't think anything about it because it was just a mosquito, but it has gotten really swollen. I've never had anything happen like this after a mosquito bite before. \" Pt reports he put rubbing alcohol and ammonia on it \"to dry it out, but it didn't help. \" Pt denies using any OTC medications like benadryl or Cortisone cream. CATHETERIZATION N/A 2024    Procedure: Left Heart Cath;  Surgeon: Abran Chand MD;  Location: [facility] CATH INVASIVE LOCATION;  Service: Cardiovascular;  Laterality: N/A;       Family History   Problem Relation Age of Onset    Dementia Mother     Breast cancer Sister 74       Social History     Tobacco Use    Smoking status: Former     Current packs/day: 0.00     Average packs/day: 1 pack/day for 32.0 years (32.0 ttl pk-yrs)     Types: Cigarettes     Start date:      Quit date:      Years since quittin.4     Passive exposure: Past    Smokeless tobacco: Never   Vaping Use    Vaping status: Never Used   Substance Use Topics    Alcohol use: Yes     Alcohol/week: 1.0 standard drink of alcohol     Types: 1 Cans of beer per week    Drug use: Never       Home Meds:   Medications Prior to Admission   Medication Sig Dispense Refill Last Dose/Taking    apixaban (ELIQUIS) 5 MG tablet tablet Take 1 tablet by mouth Every 12 (Twelve) Hours. Indications: Atrial Fibrillation 60 tablet 0 Past Week    bacitracin 500 UNIT/GM ointment Apply 1 Application topically to the appropriate area as directed 2 (Two) Times a Day As Needed for Wound Care.   Past Week    Cholecalciferol (VITAMIN D-3 PO) Take 1 tablet by mouth Daily. Indications: Vitamin Deficiency   2025    guaiFENesin (Mucinex) 600 MG 12 hr tablet Take 1 tablet by mouth 2 (Two) Times a Day.   2025    levothyroxine (SYNTHROID, LEVOTHROID) 75 MCG tablet Take 1 tablet by mouth Every Morning. 30 tablet 1 2025    loratadine (Claritin) 10 MG tablet Take 1 tablet by mouth Daily.   Taking    multivitamin with minerals (Centrum Silver 50+Men) tablet tablet Take 1 tablet by mouth Every Morning. Indications: Vitamin Deficiency   2025    NON FORMULARY Take 2 tablets by mouth Every Night. Zzzquil  Indications: Sleep Disorder   2025    pantoprazole (PROTONIX) 40 MG EC tablet Take 1 tablet by mouth Daily.   2025    simvastatin (ZOCOR) 20 MG tablet  Take 1 tablet by mouth Every Night. Indications: High Amount of Fats in the Blood   5/20/2025    sotalol (BETAPACE) 80 MG tablet Take 1 tablet by mouth 2 (Two) Times a Day.   5/21/2025    tamsulosin (FLOMAX) 0.4 MG capsule 24 hr capsule Take 1 capsule by mouth Every Night. Indications: Benign Enlargement of Prostate   5/20/2025    thiamine (VITAMIN B-1) 100 MG tablet  tablet Take 1 tablet by mouth Daily. Indications: Vitamin Deficiency   5/20/2025    acetaminophen (TYLENOL) 325 MG tablet Take 2 tablets by mouth Every 4 (Four) Hours As Needed for Mild Pain.       ipratropium-albuterol (DUO-NEB) 0.5-2.5 mg/3 ml nebulizer Take 3 mL by nebulization Every 6 (Six) Hours As Needed for Wheezing.       midodrine (PROAMATINE) 5 MG tablet Take 1 tablet by mouth 3 (Three) Times a Day As Needed.          Scheduled Meds:  atorvastatin, 10 mg, Oral, Daily  cefTAZidime, 2,000 mg, Intravenous, Q24H  guaiFENesin, 1,200 mg, Oral, Q12H  ipratropium-albuterol, 3 mL, Nebulization, 4x Daily - RT  levothyroxine, 75 mcg, Oral, Q AM  methylPREDNISolone sodium succinate, 40 mg, Intravenous, Q12H  multivitamin with minerals, 1 tablet, Oral, QAM  pantoprazole, 40 mg, Oral, Daily  sodium chloride, 10 mL, Intravenous, Q12H  sotalol, 80 mg, Oral, BID  tamsulosin, 0.4 mg, Oral, Nightly        Continuous Infusions:  sodium chloride, 75 mL/hr, Last Rate: 75 mL/hr (05/22/25 0009)        PRN Meds:    acetaminophen    senna-docusate sodium **AND** polyethylene glycol **AND** bisacodyl **AND** bisacodyl    midodrine    nitroglycerin    ondansetron ODT **OR** ondansetron    sodium chloride    sodium chloride    Allergies:  Patient has no known allergies.    OBJECTIVE    Vital Signs  Temp:  [97.6 °F (36.4 °C)-98 °F (36.7 °C)] 98 °F (36.7 °C)  Heart Rate:  [64-97] 72  Resp:  [4-21] 19  BP: ()/(63-87) 105/69    I/O this shift:  In: 120 [P.O.:120]  Out: -   I/O last 3 completed shifts:  In: 340 [P.O.:240; IV Piggyback:100]  Out: -     Physical  "Exam:  General Appearance: alert, appears stated age and cooperative  Head: normocephalic, without obvious abnormality and atraumatic  Eyes: conjunctivae and sclerae normal and no icterus  Neck: supple and no JVD  Lungs: clear to auscultation and respirations regular  Heart: regular rhythm & normal rate and normal S1, S2  Chest Wall: no abnormalities observed  Abdomen: normal bowel sounds and soft, nontender  Extremities: moves extremities well, no edema, no cyanosis  Skin: no bleeding, bruising or rash  Neurologic: Alert, and oriented. No focal deficits    Results Review:    I reviewed the patient's new clinical results.    WBC WBC   Date Value Ref Range Status   05/22/2025 13.46 (H) 3.40 - 10.80 10*3/mm3 Final   05/21/2025 16.00 (H) 3.40 - 10.80 10*3/mm3 Final      HGB Hemoglobin   Date Value Ref Range Status   05/22/2025 9.5 (L) 13.0 - 17.7 g/dL Final     Comment:     Result checked     05/21/2025 11.5 (L) 13.0 - 17.7 g/dL Final      HCT Hematocrit   Date Value Ref Range Status   05/22/2025 29.1 (L) 37.5 - 51.0 % Final   05/21/2025 35.6 (L) 37.5 - 51.0 % Final      Platelets No results found for: \"LABPLAT\"   MCV MCV   Date Value Ref Range Status   05/22/2025 93.9 79.0 - 97.0 fL Final   05/21/2025 93.2 79.0 - 97.0 fL Final          Sodium Sodium   Date Value Ref Range Status   05/22/2025 134 (L) 136 - 145 mmol/L Final   05/21/2025 133 (L) 136 - 145 mmol/L Final      Potassium Potassium   Date Value Ref Range Status   05/22/2025 4.9 3.5 - 5.2 mmol/L Final   05/21/2025 4.4 3.5 - 5.2 mmol/L Final      Chloride Chloride   Date Value Ref Range Status   05/22/2025 103 98 - 107 mmol/L Final   05/21/2025 102 98 - 107 mmol/L Final      CO2 CO2   Date Value Ref Range Status   05/22/2025 19.2 (L) 22.0 - 29.0 mmol/L Final   05/21/2025 20.4 (L) 22.0 - 29.0 mmol/L Final      BUN BUN   Date Value Ref Range Status   05/22/2025 33 (H) 8 - 23 mg/dL Final   05/21/2025 29 (H) 8 - 23 mg/dL Final      Creatinine Creatinine   Date Value " "Ref Range Status   05/22/2025 2.34 (H) 0.76 - 1.27 mg/dL Final   05/21/2025 2.36 (H) 0.76 - 1.27 mg/dL Final      Calcium Calcium   Date Value Ref Range Status   05/22/2025 8.6 8.6 - 10.5 mg/dL Final   05/21/2025 8.7 8.6 - 10.5 mg/dL Final      PO4 No results found for: \"CAPO4\"   Albumin Albumin   Date Value Ref Range Status   05/21/2025 3.7 3.5 - 5.2 g/dL Final      Magnesium No results found for: \"MG\"   Uric Acid No results found for: \"URICACID\"       Imaging Results (Last 72 Hours)       Procedure Component Value Units Date/Time    CT Abdomen Pelvis Without Contrast [[id number]] Collected: 05/22/25 1143     Updated: 05/22/25 1152    Narrative:      CT ABDOMEN PELVIS WO CONTRAST    Date of Exam: 5/22/2025 11:30 AM EDT    Indication: hydronephrosis.    Comparison: Renal ultrasound from today, PET/CT dated 8/28/2024    Technique: Axial CT images were obtained of the abdomen and pelvis without the administration of contrast. Sagittal and coronal reconstructions were performed.  Automated exposure control and iterative reconstruction methods were used.    Findings:    Liver: The liver is unremarkable in morphology. Evaluation for focal liver lesions is limited without IV contrast. No biliary dilation is seen.    Gallbladder: Cholelithiasis.    Pancreas: Unremarkable.    Spleen: Unremarkable.    Adrenal glands: Unremarkable.    Genitourinary tract: Urinary bladder is markedly distended with urine. There is moderate bilateral hydroureteronephrosis. No urinary tract calculi are seen. Appearance is most compatible with urinary retention or bladder outlet obstruction. Patient would   likely benefit from Rogers catheterization. Prostate gland is prominent.    Gastrointestinal tract: Limited evaluation of the hollow viscera due to lack of IV contrast administration. No findings to suggest bowel obstruction.    Appendix: No findings to suggest acute appendicitis.    Other findings: No free air or free fluid is identified. No " pathologically enlarged lymph nodes are seen. Vascular calcifications are present.    Bones and soft tissues: No acute or suspicious osseous or soft tissue lesion is identified.    Lung bases: Moderate left and small right pleural effusions with bibasilar opacities which may be related to atelectasis, infiltrates, or neoplastic process. Please correlate with chest CT from 5/21/2025.      Impression:      Impression:  1.Urinary bladder is markedly distended with urine. There is moderate bilateral hydroureteronephrosis. No urinary tract calculi are seen. Appearance is most compatible with urinary retention or bladder outlet obstruction. Patient would likely benefit   from Rogers catheterization.  2.Moderate left and small right pleural effusions with bibasilar opacities which may be related to atelectasis, infiltrates, or neoplastic process. Please correlate with chest CT from 5/21/2025.  3.Cholelithiasis.  4.Additional findings as detailed above.       Electronically Signed: Shahab Haro MD    5/22/2025 11:50 AM EDT    Workstation ID: [id number]    US Renal Bilateral [[id number]] Collected: 05/22/25 0259     Updated: 05/22/25 0304    Narrative:      US RENAL BILATERAL    Date of Exam: 5/22/2025 2:15 AM EDT    Indication: worsening kidney function.  hydronephrosis.    Comparison: PET/CT 8/28/2024.    Technique: Grayscale and color Doppler ultrasound evaluation of the kidneys and urinary bladder was performed.      Findings:  The right kidney measures 11.1 x 5.7 x 5.3 cm and the left kidney measures 11.7 x 5.5 x 6.9 cm. Kidney vascularity appears grossly intact. There is moderate bilateral hydronephrosis. Kidney parenchyma is echogenic. There is no solid kidney mass.  No   echogenic shadowing stone.  No hydronephrosis.    The urinary bladder is markedly distended with a volume of 2163 mL. This is reportedly post void, but does appear unchanged from prior PET/CT performed 8/28/2024.      Impression:       Impression:  1.Moderate bilateral hydronephrosis.  2.Markedly distended urinary bladder.  3.Echogenic kidneys, which could indicate medical renal disease.            Electronically Signed: Emigdio Epps MD    5/22/2025 3:02 AM EDT    Workstation ID: [id number]    CT Chest Without Contrast Diagnostic [[id number]] Collected: 05/21/25 1757     Updated: 05/21/25 1816    Narrative:      CT CHEST WO CONTRAST DIAGNOSTIC    Date of Exam: 5/21/2025 5:48 PM EDT    Indication: PNA on XRAY, lung ca.    Comparison: Chest CT [id number]. Chest radiograph 5/21/2025.    Technique: Axial CT images were obtained of the chest without contrast administration.  Sagittal and coronal reconstructions were performed.  Automated exposure control and iterative reconstruction methods were used.      Findings:    Thyroid and thoracic inlet: Similar mildly enlarged multinodular thyroid gland.    Lymph nodes: Interval increased size of mediastinal lymphadenopathy, for example a prevascular node measures 2 cm in short axis, which was previously subcentimeter in size. There is also suggestion of increased left hilar adenopathy, not well   characterized without intravenous contrast. Interval development of left axillary lymphadenopathy measuring up to 12 mm in short axis. Interval development of left supraclavicular lymphadenopathy measuring up to 13 mm in short axis. Calcified mediastinal   and hilar nodes, likely sequelae of prior granulomatous disease.    Cardiovascular: Normal appearing heart size. Increased size of a small pericardial effusion. Aorta and main pulmonary artery diameters are within normal range.. There are coronary artery calcifications. Aortic atherosclerosis. Right PICC terminates in   the lower SVC.    Esophagus: No significant abnormality.    Lung parenchyma: Emphysema. Interval decrease interlobular septal thickening in the right lung with persistent peripheral linear opacities in the right lower lobe which could represent  atelectasis and/or scarring. Superimposed lymphangitic carcinomatosis   is not entirely excluded. Similar-appearing masslike consolidation in the posterior basal right lower lobe. Interval development of interlobular septal thickening in the left lung. New perifissural nodular opacity in the left lower lobe measuring 10 mm   on series 5, image 64. Consolidative opacity in the left lower lobe.    Airways: Trace secretions in the trachea. Bronchial wall thickening.    Pleura: Interval removal of right Pleurx catheter since prior chest CT dated 11/1/2024. Decreased size of a small right pleural effusion. Interval development of a moderate left pleural effusion.    Chest wall and osseous structures: Degenerative changes of the imaged spine. No acute osseous abnormality.    Included abdomen: Partially imaged severe bilateral hydronephrosis. Cholelithiasis.      Impression:      Impression:  Compared to chest CT dated 11/1/2024, interval development of a moderate left pleural effusion, indeterminate for malignant pleural effusion, and interlobular septal thickening in the left lung which may represent lymphangitic carcinomatosis versus   pulmonary edema.    Consolidative opacities in the left lower lobe and a perifissural nodular opacity in the left lower lobe, which may be infectious/inflammatory or may represent atelectasis. Underlying malignancy is not excluded.    Interval increase in mediastinal adenopathy and interval development of left hilar, left axillary, left supraclavicular lymphadenopathy, suspicious for increased metastatic lymphadenopathy.    Interval decrease size of a small right pleural effusion. Interval decrease in interlobular septal thickening in the right lung with persistent peripheral linear opacities in the right lower lobe which could represent atelectasis and/or scarring.   Superimposed lymphangitic carcinomatosis is not entirely excluded.    Similar-appearing masslike consolidation in the  posterior basal right lower lobe.    Partially imaged severe bilateral hydronephrosis        Electronically Signed: Dru Kruse MD    5/21/2025 6:14 PM EDT    Workstation ID: [id number]              Results for orders placed during the hospital encounter of 05/21/25    XR Chest 1 View    Narrative  XR CHEST 1 VW    Date of Exam: 5/21/2025 11:50 AM EDT    Indication: elevate wbc/ soa    Comparison: PA and lateral chest radiograph 12/12/2024    Findings:  There is extensive airspace disease throughout the left lung compatible with pneumonia which is new from the prior study. No definite consolidation the right lung. There is chronic airspace disease at the right lung base which is mildly improved from the  prior study. There is a right-sided port catheter with the tip at the cavoatrial junction. Stable right perihilar scarring. Small bilateral pleural effusions suspected. No pneumothorax.    Impression  Impression:  1. Extensive airspace disease throughout the left lung compatible with pneumonia.  2. Chronic airspace disease at the right lung base mildly improved from prior study.  3. Small bilateral pleural effusions.  4. Stable right port catheter.          Electronically Signed: Antwon Estrella MD  5/21/2025 12:11 PM EDT  Workstation ID: [id number]      Results for orders placed during the hospital encounter of 12/12/24    XR Chest PA & Lateral    Narrative  XR CHEST PA AND LATERAL    Date of Exam: 12/12/2024 2:13 PM EST    Indication: low grade fever, nasal drainage    Comparison: CT chest 11/1/2024.    Findings:  Chronic right basilar pleural thickening and pleural fluid collection with indwelling pleural catheter, corresponding to prior CT chest. Interstitial thickening is demonstrated within the right mid to lower lung zone, also corresponding to prior CT  chest. Left lung appears clear. Heart size is normal. The descending thoracic aorta appears tortuous. Right chest wall montserrat catheter extends to the caval  atrial junction. No visible pneumothorax    Impression  Impression:  Right mid to lower lung zone interstitial thickening with small right basilar pleural fluid and indwelling pleural catheter, not thought to be significantly changed when compared to the CT chest of 11/1/2024.  No superimposed acute airspace disease is seen.      Electronically Signed: Mar Aly MD  12/13/2024 4:32 PM EST  Workstation ID: [id number]      Results for orders placed during the hospital encounter of 09/16/24    XR Chest PA & Lateral    Narrative  XR CHEST PA AND LATERAL    Date of Exam: 9/16/2024 5:08 PM EDT    Indication: SOA, history of pleural effusion. NSCLC patient is scheduled for right-sided pleural drainage catheter placement    Comparison: Chest radiograph 9/11/2024, 9/9/2024    Findings:  Continued worsening appearance of the right hemithorax now with near complete opacification. There is a persistent large right pleural effusion resulting in compressive atelectasis of the right middle lobe and right lower lobe. Right lower lobe mass is  obscured. There is worsening consolidation in the right upper lobe consistent with pneumonia. Right-sided port catheter tip is at the cavoatrial junction. The left lung is clear. No effusion or pneumothorax on the left. Osseous structures intact.    Impression  Impression:  1. Continued worsening appearance of the right hemithorax now with near complete opacification of the right hemithorax related to large right pleural effusion with compressive atelectasis of the right middle lobe and right lower lobe. Right lower lobe  mass is obscured.  2. Worsening consolidation in right upper lobe consistent with pneumonia with only a small amount of aerated lung overlying the right upper lobe..  3. Left lung clear.        Electronically Signed: Antwon Estrella MD  9/16/2024 5:37 PM EDT  Workstation ID: [id number]            ASSESSMENT / PLAN      Pneumonia    Pleural effusion    Stage IV adenocarcinoma of  lung, right    Stage IV adenocarcinoma of lung, left    CKD (chronic kidney disease) stage 4, GFR 15-29 ml/min    Hydronephrosis    Atrial fibrillation    Chronic anticoagulation    Hypoxia    Hypothyroidism (acquired)    Dysphagia    MARIA DEL ROSARIO-due to obstructive uropathy and her volume depletion.  Place Rogers catheter.  Ask urology to see.  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  Recent UA bland  Obstructive uropathy-renal ultrasound with bilateral moderate hydronephrosis and distended bladder.  Will place Rogers.  History of lung cancer-on Keytruda, followed by Dr. Tim  Anemia, chronic  Mild hyponatremia-monitor  Pneumonia, moderate left pleural effusion-per pulmonary.  Plans noted for bronch tomorrow  Hyperlipidemia  A-fib  Hypothyroidism        I discussed the patient's findings and my recommendations with patient    Will follow along closely. Thank you for allowing us to see this patient in renal consultation.    [facility]  [phone]  MD Aman Becerril MD  05/22/25  12:20 EDT